# Patient Record
Sex: FEMALE | Race: WHITE | NOT HISPANIC OR LATINO | Employment: FULL TIME | URBAN - METROPOLITAN AREA
[De-identification: names, ages, dates, MRNs, and addresses within clinical notes are randomized per-mention and may not be internally consistent; named-entity substitution may affect disease eponyms.]

---

## 2017-01-16 ENCOUNTER — OFFICE VISIT (OUTPATIENT)
Dept: PEDIATRICS | Facility: CLINIC | Age: 32
End: 2017-01-16
Payer: COMMERCIAL

## 2017-01-16 VITALS
WEIGHT: 136.19 LBS | RESPIRATION RATE: 16 BRPM | BODY MASS INDEX: 21.89 KG/M2 | SYSTOLIC BLOOD PRESSURE: 110 MMHG | TEMPERATURE: 97.8 F | OXYGEN SATURATION: 99 % | DIASTOLIC BLOOD PRESSURE: 70 MMHG | HEIGHT: 66 IN | HEART RATE: 90 BPM

## 2017-01-16 DIAGNOSIS — Z84.0 FAMILY HISTORY OF LUPUS ERYTHEMATOSUS: ICD-10-CM

## 2017-01-16 DIAGNOSIS — M25.512 ACUTE PAIN OF LEFT SHOULDER: ICD-10-CM

## 2017-01-16 DIAGNOSIS — Z00.00 WELL WOMAN EXAM WITHOUT GYNECOLOGICAL EXAM: Primary | ICD-10-CM

## 2017-01-16 LAB
CRP SERPL-MCNC: <2.9 MG/L (ref 0–8)
ERYTHROCYTE [SEDIMENTATION RATE] IN BLOOD BY WESTERGREN METHOD: 9 MM/H (ref 0–20)

## 2017-01-16 PROCEDURE — 86038 ANTINUCLEAR ANTIBODIES: CPT | Performed by: INTERNAL MEDICINE

## 2017-01-16 PROCEDURE — 85652 RBC SED RATE AUTOMATED: CPT | Performed by: INTERNAL MEDICINE

## 2017-01-16 PROCEDURE — 36415 COLL VENOUS BLD VENIPUNCTURE: CPT | Performed by: INTERNAL MEDICINE

## 2017-01-16 PROCEDURE — 86140 C-REACTIVE PROTEIN: CPT | Performed by: INTERNAL MEDICINE

## 2017-01-16 PROCEDURE — 99395 PREV VISIT EST AGE 18-39: CPT | Performed by: INTERNAL MEDICINE

## 2017-01-16 NOTE — NURSING NOTE
"Chief Complaint   Patient presents with     Physical       Initial /70 mmHg  Pulse 90  Temp(Src) 97.8  F (36.6  C) (Oral)  Resp 16  Ht 5' 5.5\" (1.664 m)  Wt 136 lb 3 oz (61.774 kg)  BMI 22.31 kg/m2  SpO2 99%  LMP 01/08/2017 (Exact Date)  Breastfeeding? No Estimated body mass index is 22.31 kg/(m^2) as calculated from the following:    Height as of this encounter: 5' 5.5\" (1.664 m).    Weight as of this encounter: 136 lb 3 oz (61.774 kg).  BP completed using cuff size: sahara Lewis MA 1/16/2017 7:47 AM      "

## 2017-01-16 NOTE — MR AVS SNAPSHOT
After Visit Summary   1/16/2017    Ana Jackson    MRN: [id number]           Patient Information     Date Of Birth          1985        Visit Information        Provider Department      1/16/2017 7:50 AM Chloé Hussein MD [facility]        Today's Diagnoses     Acute pain of left shoulder    -  1     Family history of lupus erythematosus         Well woman exam without gynecological exam           Care Instructions    L shoulder pain:  Most likely impingement syndrome vs rotator cuff injury.  -- start with physical therapy  -- continue with ice, ibuprofen as needed  -- if no improvement in 4 weeks with PT, call for a referral to Sports Medicine    Come back in 6-12 months for pap smear.   Preventive Health Recommendations  Female Ages 26 - 39  Yearly exam:   See your health care provider every year in order to    Review health changes.     Discuss preventive care.      Review your medicines if you your doctor has prescribed any.    Until age 30: Get a Pap test every three years (more often if you have had an abnormal result).    After age 30: Talk to your doctor about whether you should have a Pap test every 3 years or have a Pap test with HPV screening every 5 years.   You do not need a Pap test if your uterus was removed (hysterectomy) and you have not had cancer.  You should be tested each year for STDs (sexually transmitted diseases), if you're at risk.   Talk to your provider about how often to have your cholesterol checked.  If you are at risk for diabetes, you should have a diabetes test (fasting glucose).  Shots: Get a flu shot each year. Get a tetanus shot every 10 years.   Nutrition:     Eat at least 5 servings of fruits and vegetables each day.    Eat whole-grain bread, whole-wheat pasta and brown rice instead of white grains and rice.    Talk to your provider about Calcium and Vitamin D.     Lifestyle    Exercise at least 150 minutes a week (30 minutes a day, 5  days of the week). This will help you control your weight and prevent disease.    Limit alcohol to one drink per day.    No smoking.     Wear sunscreen to prevent skin cancer.    See your dentist every six months for an exam and cleaning.            Follow-ups after your visit        Additional Services     [facility] PT, HAND, AND CHIROPRACTIC REFERRAL       **This order will print in the [facility] Scheduling Office**    Physical Therapy, Hand Therapy and Chiropractic Care are available through:    *[facility]  *[facility]  *[facility]    Call one number to schedule at any of the above locations: [phone].    Your provider has referred you to: Physical Therapy at [facility] or [facility]    Indication/Reason for Referral: Shoulder Pain  Onset of Illness: 3 months ago  Therapy Orders: Evaluate and Treat  Special Programs: None  Special Request: None    Chiquita Porras      Additional Comments for the Therapist or Chiropractor:     Please be aware that coverage of these services is subject to the terms and limitations of your health insurance plan.  Call member services at your health plan with any benefit or coverage questions.      Please bring the following to your appointment:    *Your personal calendar for scheduling future appointments  *Comfortable clothing                  Who to contact     If you have questions or need follow up information about today's clinic visit or your schedule please contact [facility] directly at [phone].  Normal or non-critical lab and imaging results will be communicated to you by MyChart, letter or phone within 4 business days after the clinic has received the results. If you do not hear from us within 7 days, please contact the clinic through MyChart or phone. If you have a critical or abnormal lab result, we will notify you by phone as soon as possible.  Submit refill requests through [employer] or call your pharmacy and they  "will forward the refill request to us. Please allow 3 business days for your refill to be completed.          Additional Information About Your Visit        [employer] Information     [employer] lets you send messages to your doctor, view your test results, renew your prescriptions, schedule appointments and more. To sign up, go to [email or web address] . Click on \"Log in\" on the left side of the screen, which will take you to the Welcome page. Then click on \"Sign up Now\" on the right side of the page.     You will be asked to enter the access code listed below, as well as some personal information. Please follow the directions to create your username and password.     Your access code is: [email or web address]  Expires: 2017  8:21 AM     Your access code will  in 90 days. If you need help or a new code, please call your [facility] clinic or [phone].        Care EveryWhere ID     This is your [facility] EveryWhere ID. This could be used by other organizations to access your [facility] medical records  [id number]        Your Vitals Were     Pulse Temperature Respirations    90 97.8  F (36.6  C) (Oral) 16    Height BMI (Body Mass Index) Pulse Oximetry    5' 5.5\" (1.664 m) 22.31 kg/m2 99%    Last Period Breastfeeding?       2017 (Exact Date) No        Blood Pressure from Last 3 Encounters:   17 110/70   10/14/16 127/77   16 104/62    Weight from Last 3 Encounters:   17 136 lb 3 oz (61.774 kg)   10/14/16 132 lb 3.2 oz (59.966 kg)   16 128 lb 14.4 oz (58.469 kg)              We Performed the Following     Antinuclear antibody screen by EIA     CRP, inflammation     Erythrocyte sedimentation rate auto     [facility] PT, HAND, AND CHIROPRACTIC REFERRAL        Primary Care Provider Office Phone # Fax #    Chloé Hussein [phone]       [facility] [address] [place] [address]        Thank you!     Thank you for choosing [facility]  for your care. Our goal is " always to provide you with excellent care. Hearing back from our patients is one way we can continue to improve our services. Please take a few minutes to complete the written survey that you may receive in the mail after your visit with us. Thank you!             Your Updated Medication List - Protect others around you: Learn how to safely use, store and throw away your medicines at www.disposemymeds.org.          This list is accurate as of: 1/16/17  8:21 AM.  Always use your most recent med list.                   Brand Name Dispense Instructions for use    desogestrel-ethinyl estradiol 0.15-30 MG-MCG per tablet    APRI    84 tablet    Take 1 tablet by mouth daily

## 2017-01-16 NOTE — PROGRESS NOTES
SUBJECTIVE:     CC: Ana Jackson is an 31 year old woman who presents for preventive health visit.     Healthy Habits:    Do you get at least three servings of calcium containing foods daily (dairy, green leafy vegetables, etc.)? Yes- unsure , taking calcium and/or vitamin D supplement: no    Amount of exercise or daily activities, outside of work: 3-4 day(s) per week    Problems taking medications regularly No    Medication side effects: No    Have you had an eye exam in the past two years? no    Do you see a dentist twice per year? no    Do you have sleep apnea, excessive snoring or daytime drowsiness?no    Concerns:  1. Skin concern: Has a flesh colored mole on L shoulder, has been stable but wants to know if this looks ok.    2. L shoulder pain: This has been going on since prior to being seen in October. Has improved slightly, but  at front of shoulder. Struggles to lift arm high behind her back. No injury that she recalls. No swelling or redness. Does have family history of SLE in her mother, wondering if this could be contributing.     Today's PHQ-2 Score:   PHQ-2 ( 1999 Pfizer) 1/16/2017 6/17/2016   Q1: Little interest or pleasure in doing things 0 0   Q2: Feeling down, depressed or hopeless 0 0   PHQ-2 Score 0 0       Abuse: Current or Past(Physical, Sexual or Emotional)- No  Do you feel safe in your environment - Yes    Social History   Substance Use Topics     Smoking status: Former Smoker -- 0.50 packs/day for 2 years     Types: Cigarettes     Quit date: 11/01/2005     Smokeless tobacco: Not on file      Comment: NO 2nd hand smoke at work     Alcohol Use: Yes      Comment: occ.     The patient does not drink >3 drinks per day nor >7 drinks per week.    Recent Labs   Lab Test  10/26/15   1020   CHOL  203*   HDL  68   LDL  119   TRIG  79   CHOLHDLRATIO  3.0       Reviewed orders with patient.  Reviewed health maintenance and updated orders accordingly - Yes    Mammo Decision  "Support:  Mammo discussed, not appropriate for or declined by this patient.    Pertinent mammograms are reviewed under the imaging tab.  History of abnormal Pap smear: YES - updated in Problem List and Health Maintenance accordingly  All Histories reviewed and updated in Epic.      ROS:  C: NEGATIVE for fever, chills, change in weight  I: NEGATIVE for worrisome rashes, moles or lesions  E: NEGATIVE for vision changes or irritation  ENT: NEGATIVE for ear, mouth and throat problems  R: NEGATIVE for significant cough or SOB  B: NEGATIVE for masses, tenderness or discharge  CV: NEGATIVE for chest pain, palpitations or peripheral edema  GI: NEGATIVE for nausea, abdominal pain, heartburn, or change in bowel habits  : NEGATIVE for unusual urinary or vaginal symptoms. Periods are regular.  MUSCULOSKELETAL:See above.   N: NEGATIVE for weakness, dizziness or paresthesias  P: NEGATIVE for changes in mood or affect    Problem list, Medication list, Allergies, and Medical/Social/Surgical histories reviewed in EPIC and updated as appropriate.  Labs reviewed in EPIC  OBJECTIVE:     /70 mmHg  Pulse 90  Temp(Src) 97.8  F (36.6  C) (Oral)  Resp 16  Ht 5' 5.5\" (1.664 m)  Wt 136 lb 3 oz (61.774 kg)  BMI 22.31 kg/m2  SpO2 99%  LMP 01/08/2017 (Exact Date)  Breastfeeding? No  EXAM:  GENERAL: healthy, alert and no distress  EYES: Eyes grossly normal to inspection, PERRL and conjunctivae and sclerae normal  HENT: ear canals and TM's normal, nose and mouth without ulcers or lesions  NECK: no adenopathy, no asymmetry, masses, or scars and thyroid normal to palpation  RESP: lungs clear to auscultation - no rales, rhonchi or wheezes  BREAST: normal without masses, tenderness or nipple discharge and no palpable axillary masses or adenopathy  CV: regular rate and rhythm, normal S1 S2, no S3 or S4, no murmur, click or rub, no peripheral edema and peripheral pulses strong  ABDOMEN: soft, nontender, no hepatosplenomegaly, no masses " "and bowel sounds normal  MS: mild tenderness to palpation over acromion and L anterior shoulder. Pain limits internal rotation and extension.   SKIN: Small 3mm flesh colored papule over L shoulder.   NEURO: Normal strength and tone, mentation intact and speech normal  PSYCH: mentation appears normal, affect normal/bright    ASSESSMENT/PLAN:     1. Well woman exam without gynecological exam  Counseling as below. Due for pap in 6 months given hx of abnormal paps. Previously received flu vaccine. Due for colonoscopy at age 37.    2. Acute pain of left shoulder  Anticipate this is likely impingement syndrome. Less likely rotator cuff tear, given lack of injury but certainly possible. Will trial PT, NSAIDs, and ice x4 weeks, if no improvement patient will call for [facility] referral.   - [facility] PT, HAND, AND CHIROPRACTIC REFERRAL  - CRP, inflammation  - Erythrocyte sedimentation rate auto  - Antinuclear antibody screen by EIA    3. Family history of lupus erythematosus  Anticipate that this is likely not the source of patient's L shoulder pain, however, given family history and patient's concerns reasonable to screen.   - [facility] PT, HAND, AND CHIROPRACTIC REFERRAL    COUNSELING:   Reviewed preventive health counseling, as reflected in patient instructions  Special attention given to:        Regular exercise       Healthy diet/nutrition       Contraception       Colon cancer screening         reports that she quit smoking about 11 years ago. Her smoking use included Cigarettes. She has a 1 pack-year smoking history. She does not have any smokeless tobacco history on file.    Estimated body mass index is 21.66 kg/(m^2) as calculated from the following:    Height as of 10/14/16: 5' 5.5\" (1.664 m).    Weight as of 10/14/16: 132 lb 3.2 oz (59.966 kg).       Counseling Resources:  ATP IV Guidelines  Pooled Cohorts Equation Calculator  Breast Cancer Risk Calculator  FRAX Risk Assessment  ICSI Preventive Guidelines  Dietary Guidelines for " Americans, 2010  USDA's MyPlate  ASA Prophylaxis  Lung CA Screening    Chloé Jackson MD  [facility]

## 2017-01-16 NOTE — PATIENT INSTRUCTIONS
L shoulder pain:  Most likely impingement syndrome vs rotator cuff injury.  -- start with physical therapy  -- continue with ice, ibuprofen as needed  -- if no improvement in 4 weeks with PT, call for a referral to Sports Medicine    Come back in 6-12 months for pap smear.   Preventive Health Recommendations  Female Ages 26 - 39  Yearly exam:   See your health care provider every year in order to    Review health changes.     Discuss preventive care.      Review your medicines if you your doctor has prescribed any.    Until age 30: Get a Pap test every three years (more often if you have had an abnormal result).    After age 30: Talk to your doctor about whether you should have a Pap test every 3 years or have a Pap test with HPV screening every 5 years.   You do not need a Pap test if your uterus was removed (hysterectomy) and you have not had cancer.  You should be tested each year for STDs (sexually transmitted diseases), if you're at risk.   Talk to your provider about how often to have your cholesterol checked.  If you are at risk for diabetes, you should have a diabetes test (fasting glucose).  Shots: Get a flu shot each year. Get a tetanus shot every 10 years.   Nutrition:     Eat at least 5 servings of fruits and vegetables each day.    Eat whole-grain bread, whole-wheat pasta and brown rice instead of white grains and rice.    Talk to your provider about Calcium and Vitamin D.     Lifestyle    Exercise at least 150 minutes a week (30 minutes a day, 5 days of the week). This will help you control your weight and prevent disease.    Limit alcohol to one drink per day.    No smoking.     Wear sunscreen to prevent skin cancer.    See your dentist every six months for an exam and cleaning.

## 2017-01-17 LAB — ANA SER QL IA: NORMAL

## 2017-03-09 DIAGNOSIS — Z30.41 ORAL CONTRACEPTIVE PILL SURVEILLANCE: ICD-10-CM

## 2017-03-09 RX ORDER — DESOGESTREL AND ETHINYL ESTRADIOL 0.15-0.03
1 KIT ORAL DAILY
Qty: 84 TABLET | Refills: 1 | Status: SHIPPED | OUTPATIENT
Start: 2017-03-09 | End: 2017-08-19

## 2017-05-26 ENCOUNTER — HOSPITAL ENCOUNTER (EMERGENCY)
Facility: CLINIC | Age: 32
Discharge: HOME | End: 2017-05-26
Attending: EMERGENCY MEDICINE | Admitting: EMERGENCY MEDICINE
Payer: COMMERCIAL

## 2017-05-26 VITALS
RESPIRATION RATE: 20 BRPM | DIASTOLIC BLOOD PRESSURE: 62 MMHG | HEART RATE: 100 BPM | SYSTOLIC BLOOD PRESSURE: 106 MMHG | TEMPERATURE: 97.6 F | OXYGEN SATURATION: 98 %

## 2017-05-26 DIAGNOSIS — K52.9 GASTROENTERITIS: ICD-10-CM

## 2017-05-26 LAB
ALBUMIN UR-MCNC: NEGATIVE MG/DL
ANION GAP SERPL CALCULATED.3IONS-SCNC: 8 MMOL/L (ref 3–14)
APPEARANCE UR: ABNORMAL
BACTERIA #/AREA URNS HPF: ABNORMAL /HPF
BASOPHILS # BLD AUTO: 0.1 10E9/L (ref 0–0.2)
BASOPHILS NFR BLD AUTO: 1.1 %
BILIRUB UR QL STRIP: NEGATIVE
BUN SERPL-MCNC: 13 MG/DL (ref 7–30)
CALCIUM SERPL-MCNC: 8.6 MG/DL (ref 8.5–10.1)
CHLORIDE SERPL-SCNC: 107 MMOL/L (ref 94–109)
CO2 SERPL-SCNC: 23 MMOL/L (ref 20–32)
COLOR UR AUTO: YELLOW
CREAT SERPL-MCNC: 0.66 MG/DL (ref 0.52–1.04)
DIFFERENTIAL METHOD BLD: NORMAL
EOSINOPHIL # BLD AUTO: 0.4 10E9/L (ref 0–0.7)
EOSINOPHIL NFR BLD AUTO: 6.3 %
ERYTHROCYTE [DISTWIDTH] IN BLOOD BY AUTOMATED COUNT: 11.5 % (ref 10–15)
GFR SERPL CREATININE-BSD FRML MDRD: ABNORMAL ML/MIN/1.7M2
GLUCOSE SERPL-MCNC: 102 MG/DL (ref 70–99)
GLUCOSE UR STRIP-MCNC: NEGATIVE MG/DL
HCG UR QL: NEGATIVE
HCT VFR BLD AUTO: 35.6 % (ref 35–47)
HGB BLD-MCNC: 12 G/DL (ref 11.7–15.7)
HGB UR QL STRIP: NEGATIVE
IMM GRANULOCYTES # BLD: 0 10E9/L (ref 0–0.4)
IMM GRANULOCYTES NFR BLD: 0.2 %
KETONES UR STRIP-MCNC: 20 MG/DL
LEUKOCYTE ESTERASE UR QL STRIP: NEGATIVE
LYMPHOCYTES # BLD AUTO: 1.4 10E9/L (ref 0.8–5.3)
LYMPHOCYTES NFR BLD AUTO: 21.3 %
MCH RBC QN AUTO: 29.6 PG (ref 26.5–33)
MCHC RBC AUTO-ENTMCNC: 33.7 G/DL (ref 31.5–36.5)
MCV RBC AUTO: 88 FL (ref 78–100)
MONOCYTES # BLD AUTO: 0.7 10E9/L (ref 0–1.3)
MONOCYTES NFR BLD AUTO: 10.8 %
MUCOUS THREADS #/AREA URNS LPF: PRESENT /LPF
NEUTROPHILS # BLD AUTO: 3.9 10E9/L (ref 1.6–8.3)
NEUTROPHILS NFR BLD AUTO: 60.3 %
NITRATE UR QL: NEGATIVE
NRBC # BLD AUTO: 0 10*3/UL
NRBC BLD AUTO-RTO: 0 /100
PH UR STRIP: 6 PH (ref 5–7)
PLATELET # BLD AUTO: 261 10E9/L (ref 150–450)
POTASSIUM SERPL-SCNC: 3.3 MMOL/L (ref 3.4–5.3)
RBC # BLD AUTO: 4.05 10E12/L (ref 3.8–5.2)
RBC #/AREA URNS AUTO: 3 /HPF (ref 0–2)
SODIUM SERPL-SCNC: 138 MMOL/L (ref 133–144)
SP GR UR STRIP: 1.03 (ref 1–1.03)
SQUAMOUS #/AREA URNS AUTO: 8 /HPF (ref 0–1)
URN SPEC COLLECT METH UR: ABNORMAL
UROBILINOGEN UR STRIP-MCNC: 0 MG/DL (ref 0–2)
WBC # BLD AUTO: 6.5 10E9/L (ref 4–11)
WBC #/AREA URNS AUTO: 1 /HPF (ref 0–2)

## 2017-05-26 PROCEDURE — 81001 URINALYSIS AUTO W/SCOPE: CPT | Performed by: EMERGENCY MEDICINE

## 2017-05-26 PROCEDURE — 96361 HYDRATE IV INFUSION ADD-ON: CPT

## 2017-05-26 PROCEDURE — 25000128 H RX IP 250 OP 636: Performed by: EMERGENCY MEDICINE

## 2017-05-26 PROCEDURE — 80048 BASIC METABOLIC PNL TOTAL CA: CPT | Performed by: EMERGENCY MEDICINE

## 2017-05-26 PROCEDURE — 25000132 ZZH RX MED GY IP 250 OP 250 PS 637: Performed by: EMERGENCY MEDICINE

## 2017-05-26 PROCEDURE — 99284 EMERGENCY DEPT VISIT MOD MDM: CPT | Mod: 25

## 2017-05-26 PROCEDURE — 96374 THER/PROPH/DIAG INJ IV PUSH: CPT

## 2017-05-26 PROCEDURE — 81025 URINE PREGNANCY TEST: CPT | Performed by: EMERGENCY MEDICINE

## 2017-05-26 PROCEDURE — 85025 COMPLETE CBC W/AUTO DIFF WBC: CPT | Performed by: EMERGENCY MEDICINE

## 2017-05-26 RX ORDER — ONDANSETRON 2 MG/ML
INJECTION INTRAMUSCULAR; INTRAVENOUS
Status: DISCONTINUED
Start: 2017-05-26 | End: 2017-05-26 | Stop reason: HOSPADM

## 2017-05-26 RX ORDER — ONDANSETRON 2 MG/ML
4 INJECTION INTRAMUSCULAR; INTRAVENOUS EVERY 30 MIN PRN
Status: DISCONTINUED | OUTPATIENT
Start: 2017-05-26 | End: 2017-05-26 | Stop reason: HOSPADM

## 2017-05-26 RX ORDER — DIPHENOXYLATE HCL/ATROPINE 2.5-.025MG
2 TABLET ORAL ONCE
Status: COMPLETED | OUTPATIENT
Start: 2017-05-26 | End: 2017-05-26

## 2017-05-26 RX ORDER — ONDANSETRON 4 MG/1
4 TABLET, FILM COATED ORAL EVERY 8 HOURS PRN
Qty: 10 TABLET | Refills: 0 | Status: SHIPPED | OUTPATIENT
Start: 2017-05-26 | End: 2018-06-12

## 2017-05-26 RX ORDER — SODIUM CHLORIDE 9 MG/ML
1000 INJECTION, SOLUTION INTRAVENOUS CONTINUOUS
Status: DISCONTINUED | OUTPATIENT
Start: 2017-05-26 | End: 2017-05-26 | Stop reason: HOSPADM

## 2017-05-26 RX ORDER — LIDOCAINE 40 MG/G
CREAM TOPICAL
Status: DISCONTINUED | OUTPATIENT
Start: 2017-05-26 | End: 2017-05-26 | Stop reason: HOSPADM

## 2017-05-26 RX ORDER — LOPERAMIDE HYDROCHLORIDE 2 MG/1
2 TABLET ORAL 4 TIMES DAILY PRN
Qty: 30 TABLET | Refills: 0 | Status: SHIPPED | OUTPATIENT
Start: 2017-05-26 | End: 2018-06-12

## 2017-05-26 RX ADMIN — DIPHENOXYLATE HYDROCHLORIDE AND ATROPINE SULFATE 2 TABLET: 2.5; .025 TABLET ORAL at 03:07

## 2017-05-26 RX ADMIN — ONDANSETRON 4 MG: 2 INJECTION INTRAMUSCULAR; INTRAVENOUS at 02:41

## 2017-05-26 RX ADMIN — SODIUM CHLORIDE 1000 ML: 9 INJECTION, SOLUTION INTRAVENOUS at 02:41

## 2017-05-26 ASSESSMENT — ENCOUNTER SYMPTOMS
SHORTNESS OF BREATH: 0
NAUSEA: 1
FEVER: 0
DIARRHEA: 1
VOMITING: 1
ABDOMINAL PAIN: 1

## 2017-05-26 NOTE — ED NOTES
Pt c/o pain in chest and back for 3 days moving to epigastric area with NVD    Pt A&O x 3, CMS x 3, ABCD's adequate in triage

## 2017-05-26 NOTE — ED PROVIDER NOTES
History     Chief Complaint:  Multiple Complaints     The history is provided by the patient.      Ana Jackson is a 31 year old female who presents with multiple complaints.  Patient reports feeling unwell the last few days with pain in her chest and back.  This evening pain seemed more localized to her epigastrium and was associated by nausea, vomiting, and diarrhea.  She notes two total episodes of emesis and having episode of diarrhea every 15-20 minutes prompting visit to the emergency department.  Patient does note only recent sick contact was foster son who was diagnosed with a carbapenem-resistant enterobacteriaceae.  She denies recent foreign travel, fevers, shortness of breath, or other complaint.     Allergies:  No known drug allergies      Medications:    Apri     Past Medical History:    Closed fracture of forearm  Herpangina  Migraine  Excessive or frequent menstruation    Past Surgical History:    Tooth extraction    Family History:    Respiratory  Colon cancer  Asthma    Social History:  Presents with spouse   Tobacco use: Former 0.50 PPD smoker of 2 years, QD 2005  Alcohol use: Occasional  PCP: Chloé Jackson    Marital Status:         Review of Systems   Constitutional: Negative for fever.   Respiratory: Negative for shortness of breath.    Cardiovascular: Positive for chest pain.   Gastrointestinal: Positive for abdominal pain, diarrhea, nausea and vomiting.   All other systems reviewed and are negative.      Physical Exam     Patient Vitals for the past 24 hrs:   BP Temp Temp src Pulse Resp SpO2   05/26/17 0445 - - - - - 98 %   05/26/17 0430 106/67 - - - - 97 %   05/26/17 0415 - - - - - 100 %   05/26/17 0400 110/72 - - - - 97 %   05/26/17 0330 102/57 - - - - 99 %   05/26/17 0309 99/73 - - - - -   05/26/17 0213 125/86 97.6  F (36.4  C) Temporal 100 20 99 %        Physical Exam  General: Patient is uncomfortable, lying on back with hips in flexion.  HENT: Moist oral mucosa.  Eyes:  EOMI. Normal conjunctiva.  Neck: Normal range of motion. Neck supple.  Cardiovascular:  Rate 100 bpm, regular rhythm.  Pulmonary/Chest: Effort normal.    Abdominal: Thin, soft. Patient exhibits no distension and no mass. There is no tenderness.      There is no guarding.   Musculoskeletal: Normal range of motion.   Neurological: Patient  is alert and oriented.   Skin: Skin is warm and dry. No rash noted.   Psychiatric: Patient has a normal mood and affect. Normal behavior and judgement.      Emergency Department Course   Laboratory:  CBC: WNL (WBC 6.5, HGB 12.0, )   BMP: Potassium 3.3 (L), Glucose 102 (H) ow WNL (Creatinine 0.66)     UA: Ketone 20, RBC 3 (H), Bacteria few, Squam epithelial 8 (H), Mucous present, o/w Negative   UPT: Negative    Interventions:  0241: NS 1L IV Bolus   0241: Zofran 4 mg IV   0307: Lomotil 2 tablets PO     Emergency Department Course:  Past medical records, nursing notes, and vitals reviewed.  0225: I performed an exam of the patient and obtained history as documented above.    Above workup undertaken.  I personally reviewed the laboratory results with the Patient and spouse and answered all related questions prior to discharge.    0502: I rechecked the patient. Feels much improved. Findings and plan explained to the Patient and spouse. Patient discharged home with instructions regarding supportive care, medications, and reasons to return. The importance of close follow-up was reviewed.      Impression & Plan    Medical Decision Making:  Ana Jackson is a 31 year old female who presents for evaluation of nausea, vomiting and diarrhea with mild abdominal pain in a nonfocal abdominal exam.  They do note foster son was recently diagnosed with CRE infection though have no other known sick contacts.  I considered a broad differential diagnosis for this patient including viral gastroenteritis, bacterial infection of the large intestine (salmonella, shigella, campylobacter, e  coli, etc), bowel obstruction, intra-abdominal infection such as colitis, food poisoning, cholecystitis, UTI, pyelonephritis, appendicitis, etc.  There are no signs of worrisome intra-abdominal pathologies detected during the visit today.  She has a completely benign abdominal exam without rebound, guarding, or marked tenderness to palpation.  Supportive outpatient management is therefore indicated.  Abdominal pain precautions are given for home.   No indication for stool studies at this time.  No indication for CT at this time.  She passed oral challenge here in ED. It was discussed to return to the ED for blood in stool, increasing pain, or fevers more than 102.   Feels much improved after interventions in ED.      Diagnosis:    ICD-10-CM    1. Gastroenteritis K52.9        Disposition:  Discharged to home with plan as outlined.    Discharge Medications:  New Prescriptions    LOPERAMIDE (IMODIUM A-D) 2 MG TABLET    Take 1 tablet (2 mg) by mouth 4 times daily as needed for diarrhea    ONDANSETRON (ZOFRAN) 4 MG TABLET    Take 1 tablet (4 mg) by mouth every 8 hours as needed for nausea         Harvey Gordon  5/26/2017   [facility] EMERGENCY DEPARTMENT  IHarvey, wilman serving as a scribe at 2:25 AM on 5/26/2017 to document services personally performed by Mahad Mendez MD based on my observations and the provider's statements to me.       Mahad Mendez MD  05/26/17 [id number]

## 2017-05-26 NOTE — ED AVS SNAPSHOT
[facility] Emergency Department    [address] [place] [address]    Phone:  [phone]    Fax:  [phone]                                       Ana Jackson   MRN: [id number]    Department:  [facility] Emergency Department   Date of Visit:  5/26/2017           Patient Information     Date Of Birth          1985        Your diagnoses for this visit were:     Gastroenteritis        You were seen by Mahad Mendez MD.      Follow-up Information     Follow up with hCloé Hussein MD.    Specialty:  Internal Medicine    Why:  As needed, for re-evaluation of your symptoms if not gradually improving    Contact information:    [facility]  [address] [place] [address] [phone]          Discharge Instructions       Discharge Instructions  Gastroenteritis    You have been seen today for vomiting and diarrhea, called gastroenteritis or the stomach flu. This is usually caused by a virus, but some bacteria, parasites, medicines or other medical conditions can cause similar symptoms. At this time your doctor does not find that your vomiting and diarrhea is a sign of anything dangerous or life-threatening.  However, sometimes the signs of serious illness do not show up right away.  If you have new or worse symptoms, you may need to be seen again in the Emergency Department or by your primary doctor. Remember that serious problems like appendicitis can look like gastroenteritis at first.       Return to the Emergency Department if:    You keep throwing up and you are not able to keep liquids down.    You feel you are getting dehydrated, such as being very thirsty, not urinating at least every 8-12 hours, or feeling faint or lightheaded.     You develop a new fever, or your fever continues for more than 2 days.    You have belly pain that seems worse than cramps, is in one spot, or is getting worse over time.     You have blood in  your vomit or in your diarrhea.    You feel very weak.    You are not starting to improve within 24 hours of your visit here.    What can I do to help myself?    The most important thing to do is to drink clear liquids.  If you have been vomiting a lot, it is best to have only small, frequent sips of liquids.  Drinking too much at once may cause more vomiting. If you are vomiting often, you must replace minerals, sodium and potassium lost with your illness. Pedialyte  and sports drinks can help you replace these minerals.  You can also drink clear liquids such as water, weak tea, apple juice, and 7-Up . Avoid acid liquids (orange), caffeine (coffee) or alcohol. Do not drink milk until you no longer have diarrhea.    After liquids are staying down, you may start eating mild foods. Soda crackers, toast, plain noodles, gelatin, applesauce and bananas are good first choices.  Avoid foods that have acid, are spicy, fatty or fibrous (such as meats, coarse grains, vegetables). You may start eating these foods again in about 3 days when you are better.    Sometimes treatment includes prescription medicine to prevent nausea and vomiting and to prevent diarrhea. If your doctor prescribes these for you, take them as directed.    Nonprescription medicine is available for the treatment of diarrhea and can be very effective.  If you use it, make sure you use the dose recommended on the package. Avoid Lomotil . Check with your healthcare provider before you use any medicine for diarrhea.    Don t take ibuprofen, or other nonsteroidal anti-inflammatory medicines without checking with your healthcare provider.  If you were given a prescription for medicine here today, be sure to read all of the information (including the package insert) that comes with your prescription.  This will include important information about the medicine, its side effects, and any warnings that you need to know about.  The pharmacist who fills the  prescription can provide more information and answer questions you may have about the medicine.  If you have questions or concerns that the pharmacist cannot address, please call or return to the Emergency Department.       24 Hour Appointment Hotline       To make an appointment at any [facility]3], call [phone] ([phone]). If you don't have a family doctor or clinic, we will help you find one. [facility] clinics are conveniently located to serve the needs of you and your family.             Review of your medicines      START taking        Dose / Directions Last dose taken    loperamide 2 MG tablet   Commonly known as:  IMODIUM A-D   Dose:  2 mg   Quantity:  30 tablet        Take 1 tablet (2 mg) by mouth 4 times daily as needed for diarrhea   Refills:  0        ondansetron 4 MG tablet   Commonly known as:  ZOFRAN   Dose:  4 mg   Quantity:  10 tablet        Take 1 tablet (4 mg) by mouth every 8 hours as needed for nausea   Refills:  0          Our records show that you are taking the medicines listed below. If these are incorrect, please call your family doctor or clinic.        Dose / Directions Last dose taken    desogestrel-ethinyl estradiol 0.15-30 MG-MCG per tablet   Commonly known as:  APRI   Dose:  1 tablet   Quantity:  84 tablet        Take 1 tablet by mouth daily   Refills:  1                Prescriptions were sent or printed at these locations (2 Prescriptions)                   Other Prescriptions                Printed at Department/Unit printer (2 of 2)         ondansetron (ZOFRAN) 4 MG tablet               loperamide (IMODIUM A-D) 2 MG tablet                Procedures and tests performed during your visit     Basic metabolic panel    CBC with platelets differential    HCG qualitative urine    UA with Microscopic      Orders Needing Specimen Collection     None      Pending Results     No orders found from 5/24/2017 to 5/27/2017.            Pending Culture Results     No orders found from  5/24/2017 to 5/27/2017.            Pending Results Instructions     If you had any lab results that were not finalized at the time of your Discharge, you can call the ED Lab Result RN at [phone]. You will be contacted by this team for any positive Lab results or changes in treatment. The nurses are available 7 days a week from 10A to 6:30P.  You can leave a message 24 hours per day and they will return your call.        Test Results From Your Hospital Stay        5/26/2017  2:56 AM      Component Results     Component Value Ref Range & Units Status    WBC 6.5 4.0 - 11.0 10e9/L Final    RBC Count 4.05 3.8 - 5.2 10e12/L Final    Hemoglobin 12.0 11.7 - 15.7 g/dL Final    Hematocrit 35.6 35.0 - 47.0 % Final    MCV 88 78 - 100 fl Final    MCH 29.6 26.5 - 33.0 pg Final    MCHC 33.7 31.5 - 36.5 g/dL Final    RDW 11.5 10.0 - 15.0 % Final    Platelet Count 261 150 - 450 10e9/L Final    Diff Method Automated Method  Final    % Neutrophils 60.3 % Final    % Lymphocytes 21.3 % Final    % Monocytes 10.8 % Final    % Eosinophils 6.3 % Final    % Basophils 1.1 % Final    % Immature Granulocytes 0.2 % Final    Nucleated RBCs 0 0 /100 Final    Absolute Neutrophil 3.9 1.6 - 8.3 10e9/L Final    Absolute Lymphocytes 1.4 0.8 - 5.3 10e9/L Final    Absolute Monocytes 0.7 0.0 - 1.3 10e9/L Final    Absolute Eosinophils 0.4 0.0 - 0.7 10e9/L Final    Absolute Basophils 0.1 0.0 - 0.2 10e9/L Final    Abs Immature Granulocytes 0.0 0 - 0.4 10e9/L Final    Absolute Nucleated RBC 0.0  Final         5/26/2017  3:05 AM      Component Results     Component Value Ref Range & Units Status    Sodium 138 133 - 144 mmol/L Final    Potassium 3.3 (L) 3.4 - 5.3 mmol/L Final    Chloride 107 94 - 109 mmol/L Final    Carbon Dioxide 23 20 - 32 mmol/L Final    Anion Gap 8 3 - 14 mmol/L Final    Glucose 102 (H) 70 - 99 mg/dL Final    Urea Nitrogen 13 7 - 30 mg/dL Final    Creatinine 0.66 0.52 - 1.04 mg/dL Final    GFR Estimate >90  Non  GFR  Calc   >60 mL/min/1.7m2 Final    GFR Estimate If Black >90   GFR Calc   >60 mL/min/1.7m2 Final    Calcium 8.6 8.5 - 10.1 mg/dL Final         5/26/2017  3:40 AM      Component Results     Component Value Ref Range & Units Status    Color Urine Yellow  Final    Appearance Urine Slightly Cloudy  Final    Glucose Urine Negative NEG mg/dL Final    Bilirubin Urine Negative NEG Final    Ketones Urine 20 (A) NEG mg/dL Final    Specific Gravity Urine 1.027 1.003 - 1.035 Final    Blood Urine Negative NEG Final    pH Urine 6.0 5.0 - 7.0 pH Final    Protein Albumin Urine Negative NEG mg/dL Final    Urobilinogen mg/dL 0.0 0.0 - 2.0 mg/dL Final    Nitrite Urine Negative NEG Final    Leukocyte Esterase Urine Negative NEG Final    Source Midstream Urine  Final    WBC Urine 1 0 - 2 /HPF Final    RBC Urine 3 (H) 0 - 2 /HPF Final    Bacteria Urine Few (A) NEG /HPF Final    Squamous Epithelial /HPF Urine 8 (H) 0 - 1 /HPF Final    Mucous Urine Present (A) NEG /LPF Final         5/26/2017  3:41 AM      Component Results     Component Value Ref Range & Units Status    HCG Qual Urine Negative NEG Final                Clinical Quality Measure: Blood Pressure Screening     Your blood pressure was checked while you were in the emergency department today. The last reading we obtained was  BP: 106/67 . Please read the guidelines below about what these numbers mean and what you should do about them.  If your systolic blood pressure (the top number) is less than 120 and your diastolic blood pressure (the bottom number) is less than 80, then your blood pressure is normal. There is nothing more that you need to do about it.  If your systolic blood pressure (the top number) is 120-139 or your diastolic blood pressure (the bottom number) is 80-89, your blood pressure may be higher than it should be. You should have your blood pressure rechecked within a year by a primary care provider.  If your systolic blood pressure (the top number)  is 140 or greater or your diastolic blood pressure (the bottom number) is 90 or greater, you may have high blood pressure. High blood pressure is treatable, but if left untreated over time it can put you at risk for heart attack, stroke, or kidney failure. You should have your blood pressure rechecked by a primary care provider within the next 4 weeks.  If your provider in the emergency department today gave you specific instructions to follow-up with your doctor or provider even sooner than that, you should follow that instruction and not wait for up to 4 weeks for your follow-up visit.        Thank you for choosing [facility]       Thank you for choosing [facility] for your care. Our goal is always to provide you with excellent care. Hearing back from our patients is one way we can continue to improve our services. Please take a few minutes to complete the written survey that you may receive in the mail after you visit with us. Thank you!        [facility] Information     [facility] gives you secure access to your electronic health record. If you see a primary care provider, you can also send messages to your care team and make appointments. If you have questions, please call your primary care clinic.  If you do not have a primary care provider, please call [phone] and they will assist you.        Care EveryWhere ID     This is your Care EveryWhere ID. This could be used by other organizations to access your [facility] medical records  [id number]        After Visit Summary       This is your record. Keep this with you and show to your community pharmacist(s) and doctor(s) at your next visit.

## 2017-05-26 NOTE — DISCHARGE INSTRUCTIONS
Discharge Instructions  Gastroenteritis    You have been seen today for vomiting and diarrhea, called gastroenteritis or the stomach flu. This is usually caused by a virus, but some bacteria, parasites, medicines or other medical conditions can cause similar symptoms. At this time your doctor does not find that your vomiting and diarrhea is a sign of anything dangerous or life-threatening.  However, sometimes the signs of serious illness do not show up right away.  If you have new or worse symptoms, you may need to be seen again in the Emergency Department or by your primary doctor. Remember that serious problems like appendicitis can look like gastroenteritis at first.       Return to the Emergency Department if:    You keep throwing up and you are not able to keep liquids down.    You feel you are getting dehydrated, such as being very thirsty, not urinating at least every 8-12 hours, or feeling faint or lightheaded.     You develop a new fever, or your fever continues for more than 2 days.    You have belly pain that seems worse than cramps, is in one spot, or is getting worse over time.     You have blood in your vomit or in your diarrhea.    You feel very weak.    You are not starting to improve within 24 hours of your visit here.    What can I do to help myself?    The most important thing to do is to drink clear liquids.  If you have been vomiting a lot, it is best to have only small, frequent sips of liquids.  Drinking too much at once may cause more vomiting. If you are vomiting often, you must replace minerals, sodium and potassium lost with your illness. Pedialyte  and sports drinks can help you replace these minerals.  You can also drink clear liquids such as water, weak tea, apple juice, and 7-Up . Avoid acid liquids (orange), caffeine (coffee) or alcohol. Do not drink milk until you no longer have diarrhea.    After liquids are staying down, you may start eating mild foods. Soda crackers, toast, plain  noodles, gelatin, applesauce and bananas are good first choices.  Avoid foods that have acid, are spicy, fatty or fibrous (such as meats, coarse grains, vegetables). You may start eating these foods again in about 3 days when you are better.    Sometimes treatment includes prescription medicine to prevent nausea and vomiting and to prevent diarrhea. If your doctor prescribes these for you, take them as directed.    Nonprescription medicine is available for the treatment of diarrhea and can be very effective.  If you use it, make sure you use the dose recommended on the package. Avoid Lomotil . Check with your healthcare provider before you use any medicine for diarrhea.    Don t take ibuprofen, or other nonsteroidal anti-inflammatory medicines without checking with your healthcare provider.  If you were given a prescription for medicine here today, be sure to read all of the information (including the package insert) that comes with your prescription.  This will include important information about the medicine, its side effects, and any warnings that you need to know about.  The pharmacist who fills the prescription can provide more information and answer questions you may have about the medicine.  If you have questions or concerns that the pharmacist cannot address, please call or return to the Emergency Department.

## 2017-05-26 NOTE — ED AVS SNAPSHOT
[facility] Emergency Department    [address]    [facility] [address]    Phone:  [phone]    Fax:  [phone]                                       Ana Jackson   MRN: [id number]    Department:  [facility] Emergency Department   Date of Visit:  5/26/2017           After Visit Summary Signature Page     I have received my discharge instructions, and my questions have been answered. I have discussed any challenges I see with this plan with the nurse or doctor.    ..........................................................................................................................................  Patient/Patient Representative Signature      ..........................................................................................................................................  Patient Representative Print Name and Relationship to Patient    ..................................................               ................................................  Date                                            Time    ..........................................................................................................................................  Reviewed by Signature/Title    ...................................................              ..............................................  Date                                                            Time

## 2017-08-19 ENCOUNTER — HEALTH MAINTENANCE LETTER (OUTPATIENT)
Age: 32
End: 2017-08-19

## 2017-08-19 DIAGNOSIS — Z30.41 ORAL CONTRACEPTIVE PILL SURVEILLANCE: ICD-10-CM

## 2017-08-21 NOTE — TELEPHONE ENCOUNTER
desogestrel-ethinyl estradiol (APRI) 0.15-30 MG-MCG per tablet      Last Written Prescription Date: 3/9/2017  Last Fill Quantity: 84,  # refills: 1   Last Office Visit with [facility], [facility] or [facility] prescribing provider: 1/16/2017

## 2017-08-22 RX ORDER — DESOGESTREL AND ETHINYL ESTRADIOL 0.15-0.03
KIT ORAL
Qty: 84 TABLET | Refills: 1 | Status: SHIPPED | OUTPATIENT
Start: 2017-08-22 | End: 2018-02-05

## 2017-08-22 NOTE — TELEPHONE ENCOUNTER
Prescription approved per [facility] [place] [place] Refill Protocol.    Iliana Rascon RN

## 2018-02-05 ENCOUNTER — OFFICE VISIT (OUTPATIENT)
Dept: PEDIATRICS | Facility: CLINIC | Age: 33
End: 2018-02-05
Payer: COMMERCIAL

## 2018-02-05 VITALS
WEIGHT: 137.25 LBS | BODY MASS INDEX: 22.06 KG/M2 | DIASTOLIC BLOOD PRESSURE: 70 MMHG | HEART RATE: 83 BPM | SYSTOLIC BLOOD PRESSURE: 120 MMHG | TEMPERATURE: 97.6 F | HEIGHT: 66 IN | OXYGEN SATURATION: 99 %

## 2018-02-05 DIAGNOSIS — Z01.419 WELL WOMAN EXAM WITH ROUTINE GYNECOLOGICAL EXAM: Primary | ICD-10-CM

## 2018-02-05 DIAGNOSIS — Z30.41 ORAL CONTRACEPTIVE PILL SURVEILLANCE: ICD-10-CM

## 2018-02-05 DIAGNOSIS — R59.0 LAD (LYMPHADENOPATHY), ANTERIOR CERVICAL: ICD-10-CM

## 2018-02-05 DIAGNOSIS — R87.619 ABNORMAL CERVICAL PAPANICOLAOU SMEAR, UNSPECIFIED ABNORMAL PAP FINDING: ICD-10-CM

## 2018-02-05 LAB
BASOPHILS # BLD AUTO: 0 10E9/L (ref 0–0.2)
BASOPHILS NFR BLD AUTO: 0.5 %
DIFFERENTIAL METHOD BLD: NORMAL
EOSINOPHIL # BLD AUTO: 0.6 10E9/L (ref 0–0.7)
EOSINOPHIL NFR BLD AUTO: 7.4 %
ERYTHROCYTE [DISTWIDTH] IN BLOOD BY AUTOMATED COUNT: 11.8 % (ref 10–15)
HCT VFR BLD AUTO: 38.3 % (ref 35–47)
HGB BLD-MCNC: 12.2 G/DL (ref 11.7–15.7)
LYMPHOCYTES # BLD AUTO: 2.1 10E9/L (ref 0.8–5.3)
LYMPHOCYTES NFR BLD AUTO: 28.2 %
MCH RBC QN AUTO: 30 PG (ref 26.5–33)
MCHC RBC AUTO-ENTMCNC: 31.9 G/DL (ref 31.5–36.5)
MCV RBC AUTO: 94 FL (ref 78–100)
MONOCYTES # BLD AUTO: 0.5 10E9/L (ref 0–1.3)
MONOCYTES NFR BLD AUTO: 7 %
NEUTROPHILS # BLD AUTO: 4.2 10E9/L (ref 1.6–8.3)
NEUTROPHILS NFR BLD AUTO: 56.9 %
PLATELET # BLD AUTO: 308 10E9/L (ref 150–450)
RBC # BLD AUTO: 4.06 10E12/L (ref 3.8–5.2)
WBC # BLD AUTO: 7.4 10E9/L (ref 4–11)

## 2018-02-05 PROCEDURE — 99395 PREV VISIT EST AGE 18-39: CPT | Performed by: INTERNAL MEDICINE

## 2018-02-05 PROCEDURE — 85025 COMPLETE CBC W/AUTO DIFF WBC: CPT | Performed by: INTERNAL MEDICINE

## 2018-02-05 PROCEDURE — G0145 SCR C/V CYTO,THINLAYER,RESCR: HCPCS | Performed by: INTERNAL MEDICINE

## 2018-02-05 PROCEDURE — 87624 HPV HI-RISK TYP POOLED RSLT: CPT | Performed by: INTERNAL MEDICINE

## 2018-02-05 PROCEDURE — 36415 COLL VENOUS BLD VENIPUNCTURE: CPT | Performed by: INTERNAL MEDICINE

## 2018-02-05 RX ORDER — DESOGESTREL AND ETHINYL ESTRADIOL 0.15-0.03
1 KIT ORAL DAILY
Qty: 84 TABLET | Refills: 3 | Status: SHIPPED | OUTPATIENT
Start: 2018-02-05 | End: 2018-06-12

## 2018-02-05 NOTE — PATIENT INSTRUCTIONS
We will check a blood count today. Call if you are still having swelling in that lymph node and I'll send you for an ultrasound to make sure everything looks ok.    Preventive Health Recommendations  Female Ages 26 - 39  Yearly exam:   See your health care provider every year in order to    Review health changes.     Discuss preventive care.      Review your medicines if you your doctor has prescribed any.    Until age 30: Get a Pap test every three years (more often if you have had an abnormal result).    After age 30: Talk to your doctor about whether you should have a Pap test every 3 years or have a Pap test with HPV screening every 5 years.   You do not need a Pap test if your uterus was removed (hysterectomy) and you have not had cancer.  You should be tested each year for STDs (sexually transmitted diseases), if you're at risk.   Talk to your provider about how often to have your cholesterol checked.  If you are at risk for diabetes, you should have a diabetes test (fasting glucose).  Shots: Get a flu shot each year. Get a tetanus shot every 10 years.   Nutrition:     Eat at least 5 servings of fruits and vegetables each day.    Eat whole-grain bread, whole-wheat pasta and brown rice instead of white grains and rice.    Talk to your provider about Calcium and Vitamin D.     Lifestyle    Exercise at least 150 minutes a week (30 minutes a day, 5 days of the week). This will help you control your weight and prevent disease.    Limit alcohol to one drink per day.    No smoking.     Wear sunscreen to prevent skin cancer.    See your dentist every six months for an exam and cleaning.

## 2018-02-05 NOTE — MR AVS SNAPSHOT
After Visit Summary   2/5/2018    Ana Jackson    MRN: [id number]           Patient Information     Date Of Birth          1985        Visit Information        Provider Department      2/5/2018 11:30 AM Chloé Hussein MD [facility]        Today's Diagnoses     Well woman exam with routine gynecological exam    -  1    Oral contraceptive pill surveillance        Abnormal cervical Papanicolaou smear, unspecified abnormal pap finding        LAD (lymphadenopathy), anterior cervical          Care Instructions    We will check a blood count today. Call if you are still having swelling in that lymph node and I'll send you for an ultrasound to make sure everything looks ok.    Preventive Health Recommendations  Female Ages 26 - 39  Yearly exam:   See your health care provider every year in order to    Review health changes.     Discuss preventive care.      Review your medicines if you your doctor has prescribed any.    Until age 30: Get a Pap test every three years (more often if you have had an abnormal result).    After age 30: Talk to your doctor about whether you should have a Pap test every 3 years or have a Pap test with HPV screening every 5 years.   You do not need a Pap test if your uterus was removed (hysterectomy) and you have not had cancer.  You should be tested each year for STDs (sexually transmitted diseases), if you're at risk.   Talk to your provider about how often to have your cholesterol checked.  If you are at risk for diabetes, you should have a diabetes test (fasting glucose).  Shots: Get a flu shot each year. Get a tetanus shot every 10 years.   Nutrition:     Eat at least 5 servings of fruits and vegetables each day.    Eat whole-grain bread, whole-wheat pasta and brown rice instead of white grains and rice.    Talk to your provider about Calcium and Vitamin D.     Lifestyle    Exercise at least 150 minutes a week (30 minutes a day, 5 days of the week). This  "will help you control your weight and prevent disease.    Limit alcohol to one drink per day.    No smoking.     Wear sunscreen to prevent skin cancer.    See your dentist every six months for an exam and cleaning.            Follow-ups after your visit        Who to contact     If you have questions or need follow up information about today's clinic visit or your schedule please contact [facility] [place] directly at [phone].  Normal or non-critical lab and imaging results will be communicated to you by [employer], letter or phone within 4 business days after the clinic has received the results. If you do not hear from us within 7 days, please contact the clinic through "[employer]t or phone. If you have a critical or abnormal lab result, we will notify you by phone as soon as possible.  Submit refill requests through [employer]s [employer] or call your pharmacy and they will forward the refill request to us. Please allow 3 business days for your refill to be completed.          Additional Information About Your Visit        [employer]harSPARQCode Information     ams AG gives you secure access to your electronic health record. If you see a primary care provider, you can also send messages to your care team and make appointments. If you have questions, please call your primary care clinic.  If you do not have a primary care provider, please call [phone] and they will assist you.        Care EveryWhere ID     This is your Care EveryWhere ID. This could be used by other organizations to access your [facility] medical records  [id number]        Your Vitals Were     Pulse Temperature Height Last Period Pulse Oximetry Breastfeeding?    83 97.6  F (36.4  C) (Tympanic) 5' 5.5\" (1.664 m) 01/11/2018 (Approximate) 99% No    BMI (Body Mass Index)                   22.49 kg/m2            Blood Pressure from Last 3 Encounters:   02/05/18 120/70   05/26/17 106/62   01/16/17 110/70    Weight from Last 3 Encounters:   02/05/18 137 lb 4 oz (62.3 kg) "   01/16/17 136 lb 3 oz (61.8 kg)   10/14/16 132 lb 3.2 oz (60 kg)              We Performed the Following     CBC with platelets differential     HPV High Risk Types DNA Cervical     Pap imaged thin layer screen with HPV - recommended age 30 - 65          Today's Medication Changes          These changes are accurate as of 2/5/18 11:51 AM.  If you have any questions, ask your nurse or doctor.               These medicines have changed or have updated prescriptions.        Dose/Directions    desogestrel-ethinyl estradiol 0.15-30 MG-MCG per tablet   Commonly known as:  APRI   This may have changed:  See the new instructions.   Used for:  Oral contraceptive pill surveillance   Changed by:  Chloé Hussein MD        Dose:  1 tablet   Take 1 tablet by mouth daily   Quantity:  84 tablet   Refills:  3            Where to get your medicines      These medications were sent to [facility] - [address] [address]     Phone:  [phone]     desogestrel-ethinyl estradiol 0.15-30 MG-MCG per tablet                Primary Care Provider Office Phone # Fax #    Chloé Hussein [phone] [address] [place] [address]        Equal Access to Services     [facility] NANCY AH: Hadii kimberley urenao Sowilliam, waaxda luqadaha, qaybta kaalmada adeegyada, sallie shukla. So [facility] [phone].    ATENCIÓN: Si habla español, tiene a licea disposición servicios gratuitos de asistencia lingüística. Llame al [phone].    We comply with applicable federal civil rights laws and [place] laws. We do not discriminate on the basis of race, color, national origin, age, disability, sex, sexual orientation, or gender identity.            Thank you!     Thank you for choosing [facility] [place]  for your care. Our goal is always to provide you with excellent care. Hearing back from our patients is one way we can  continue to improve our services. Please take a few minutes to complete the written survey that you may receive in the mail after your visit with us. Thank you!             Your Updated Medication List - Protect others around you: Learn how to safely use, store and throw away your medicines at www.disposemymeds.org.          This list is accurate as of 2/5/18 11:51 AM.  Always use your most recent med list.                   Brand Name Dispense Instructions for use Diagnosis    desogestrel-ethinyl estradiol 0.15-30 MG-MCG per tablet    APRI    84 tablet    Take 1 tablet by mouth daily    Oral contraceptive pill surveillance       loperamide 2 MG tablet    IMODIUM A-D    30 tablet    Take 1 tablet (2 mg) by mouth 4 times daily as needed for diarrhea        ondansetron 4 MG tablet    ZOFRAN    10 tablet    Take 1 tablet (4 mg) by mouth every 8 hours as needed for nausea

## 2018-02-05 NOTE — NURSING NOTE
"Chief Complaint   Patient presents with     Physical       Initial /70 (BP Location: Right arm, Patient Position: Chair, Cuff Size: Adult Regular)  Pulse 83  Temp 97.6  F (36.4  C) (Tympanic)  Ht 5' 5.5\" (1.664 m)  Wt 137 lb 4 oz (62.3 kg)  LMP 01/11/2018 (Approximate)  SpO2 99%  Breastfeeding? No  BMI 22.49 kg/m2 Estimated body mass index is 22.49 kg/(m^2) as calculated from the following:    Height as of this encounter: 5' 5.5\" (1.664 m).    Weight as of this encounter: 137 lb 4 oz (62.3 kg).  Medication Reconciliation: complete     Jerica Lewis MA 2/5/2018 11:29 AM    "

## 2018-02-05 NOTE — LETTER
February 9, 2018    Ana [place]  [address]  [place] [address]    Dear Ana,  We are happy to inform you that your PAP smear result from 2/5/18 is normal.  We are now able to do a follow up test on PAP smears. The DNA test is for HPV (Human Papilloma Virus). Cervical cancer is closely linked with certain types of HPV. Your result showed no evidence of high risk HPV.  Therefore we recommend you return in 5 years for your next pap smear and HPV test.  You will still need to return to the clinic every year for an annual exam and other preventive tests.  Please contact the clinic at [phone] with any questions.  Sincerely,    Chloé Jackson MD/cody

## 2018-02-05 NOTE — PROGRESS NOTES
SUBJECTIVE:   CC: Ana Jackson is an 32 year old woman who presents for preventive health visit.     Physical   Annual:     Getting at least 3 servings of Calcium per day::  Yes    Bi-annual eye exam::  NO    Dental care twice a year::  NO    Sleep apnea or symptoms of sleep apnea::  None    Diet::  Regular (no restrictions)    Frequency of exercise::  2-3 days/week    Duration of exercise::  45-60 minutes    Taking medications regularly::  Yes    Medication side effects::  None    Additional concerns today::  No          Concerns:  Pt states concerns for swollen lymph node on right side x2 weeks. She reports that it actually used to be much more prominent, now improving in size. No overlying redness or warmth, no tenderness. No other lymph nodes. No fevers, chills, weight loss, night sweats. No recent travel or known TB exposures.     Today's PHQ-2 Score:   PHQ-2 ( 1999 Pfizer) 1/16/2017   Q1: Little interest or pleasure in doing things 0   Q2: Feeling down, depressed or hopeless 0   PHQ-2 Score 0       Abuse: Current or Past(Physical, Sexual or Emotional)- No  Do you feel safe in your environment - Yes    Social History   Substance Use Topics     Smoking status: Former Smoker     Packs/day: 0.50     Years: 2.00     Types: Cigarettes     Quit date: 11/1/2005     Smokeless tobacco: Not on file      Comment: NO 2nd hand smoke at work     Alcohol use 0.0 oz/week     0 Standard drinks or equivalent per week      Comment: occ.     No flowsheet data found.    Reviewed orders with patient.  Reviewed health maintenance and updated orders accordingly - Yes  Patient Active Problem List   Diagnosis     Migraine without aura     Excessive or frequent menstruation     CARDIOVASCULAR SCREENING; LDL GOAL LESS THAN 160     Abnormal Pap smear of cervix     Family history of colon cancer     Past Surgical History:   Procedure Laterality Date     HC TOOTH EXTRACTION W/FORCEP         Social History   Substance Use Topics      "Smoking status: Former Smoker     Packs/day: 0.50     Years: 2.00     Types: Cigarettes     Quit date: 11/1/2005     Smokeless tobacco: Never Used      Comment: NO 2nd hand smoke at work     Alcohol use 0.0 oz/week     0 Standard drinks or equivalent per week      Comment: occ.     Family History   Problem Relation Age of Onset     Respiratory Mother      Colon Cancer Mother      CANCER Maternal Grandmother      skin     Respiratory Sister      EXERCISE INDUCED ASTHMA THROUGH ADOLECENCE.     Respiratory Brother      \"     Cancer - colorectal Maternal Aunt 58     Other Cancer Maternal Aunt      rectal     Anesthesia Reaction No family hx of      Blood Disease No family hx of      Neurologic Disorder No family hx of      Breast Cancer No family hx of      HEART DISEASE No family hx of            Mammo discussed, not appropriate for or declined by this patient.    Pertinent mammograms are reviewed under the imaging tab.  History of abnormal Pap smear: NO - age 30-65 PAP every 5 years with negative HPV co-testing recommended    Reviewed and updated as needed this visit by clinical staff  Tobacco  Allergies  Meds  Med Hx  Fam Hx  Soc Hx            Review of Systems  C: NEGATIVE for fever, chills, change in weight  I: NEGATIVE for worrisome rashes, moles or lesions  E: NEGATIVE for vision changes or irritation  ENT: NEGATIVE for ear, mouth and throat problems  R: NEGATIVE for significant cough or SOB  B: NEGATIVE for masses, tenderness or discharge  CV: NEGATIVE for chest pain, palpitations or peripheral edema  GI: NEGATIVE for nausea, abdominal pain, heartburn, or change in bowel habits  : NEGATIVE for unusual urinary or vaginal symptoms. Periods are regular.  M: NEGATIVE for significant arthralgias or myalgia  N: NEGATIVE for weakness, dizziness or paresthesias  P: NEGATIVE for changes in mood or affect     OBJECTIVE:   /70 (BP Location: Right arm, Patient Position: Chair, Cuff Size: Adult Regular)  Pulse " "83  Temp 97.6  F (36.4  C) (Tympanic)  Ht 5' 5.5\" (1.664 m)  Wt 137 lb 4 oz (62.3 kg)  LMP 01/11/2018 (Approximate)  SpO2 99%  Breastfeeding? No  BMI 22.49 kg/m2  Physical Exam  GENERAL: healthy, alert and no distress  EYES: Eyes grossly normal to inspection, PERRL and conjunctivae and sclerae normal  HENT: ear canals and TM's normal, nose and mouth without ulcers or lesions  NECK: R anterior cervical lymph node, approximately 1-1.5cm in size, without overlying warmth or erythema, non-tender and mobile on exam.   RESP: lungs clear to auscultation - no rales, rhonchi or wheezes  BREAST: normal without masses, tenderness or nipple discharge and no palpable axillary masses or adenopathy  CV: regular rate and rhythm, normal S1 S2, no S3 or S4, no murmur  ABDOMEN: soft, nontender, no hepatosplenomegaly, no masses and bowel sounds normal   (female): normal female external genitalia, normal urethral meatus, vaginal mucosa pink, moist, well rugated, and normal cervix without masses or discharge  MS: no gross musculoskeletal defects noted, no edema  SKIN: no suspicious lesions or rashes  NEURO: Normal strength and tone, mentation intact and speech normal  PSYCH: mentation appears normal, affect normal/bright    ASSESSMENT/PLAN:   1. Well woman exam with routine gynecological exam  Counseling as below. Due for pap.   - Pap imaged thin layer screen with HPV - recommended age 30 - 65  - HPV High Risk Types DNA Cervical    2. Oral contraceptive pill surveillance  Reviewed risks of OCP, including increased risk for DVT/CVA. Patient and her  considering getting pregnant soon, will start prenatal vitamin with folic acid and stop OCP when ready.   - desogestrel-ethinyl estradiol (APRI) 0.15-30 MG-MCG per tablet; Take 1 tablet by mouth daily  Dispense: 84 tablet; Refill: 3    3. Abnormal cervical Papanicolaou smear, unspecified abnormal pap finding  Due for pap.   - Pap imaged thin layer screen with HPV - recommended " "age 30 - 65  - HPV High Risk Types DNA Cervical    4. LAD (lymphadenopathy), anterior cervical  Given size will obtain CBC; if nl and lymphadenopathy does not resolve in 2-4 weeks, will obtain US to make sure this isn't a brachial cleft cyst.   - CBC with platelets differential    COUNSELING:  Reviewed preventive health counseling, as reflected in patient instructions  Special attention given to:        Regular exercise       Healthy diet/nutrition       Contraception    BP Screening:   Last 3 BP Readings:    BP Readings from Last 3 Encounters:   02/05/18 120/70   05/26/17 106/62   01/16/17 110/70       The following was recommended to the patient:  Re-screen BP within a year and recommended lifestyle modifications     reports that she quit smoking about 12 years ago. Her smoking use included Cigarettes. She has a 1.00 pack-year smoking history. She has never used smokeless tobacco.    Estimated body mass index is 22.49 kg/(m^2) as calculated from the following:    Height as of this encounter: 5' 5.5\" (1.664 m).    Weight as of this encounter: 137 lb 4 oz (62.3 kg).       Counseling Resources:  ATP IV Guidelines  Pooled Cohorts Equation Calculator  Breast Cancer Risk Calculator  FRAX Risk Assessment  ICSI Preventive Guidelines  Dietary Guidelines for Americans, 2010  [employer]'s MyPlate  ASA Prophylaxis  Lung CA Screening    Chloé Jackson MD  [facility] [place]  Answers for HPI/ROS submitted by the patient on 2/5/2018   PHQ-2 Score: 0    "

## 2018-02-07 LAB
COPATH REPORT: NORMAL
PAP: NORMAL

## 2018-02-08 LAB
FINAL DIAGNOSIS: NORMAL
HPV HR 12 DNA CVX QL NAA+PROBE: NEGATIVE
HPV16 DNA SPEC QL NAA+PROBE: NEGATIVE
HPV18 DNA SPEC QL NAA+PROBE: NEGATIVE
SPECIMEN DESCRIPTION: NORMAL
SPECIMEN SOURCE CVX/VAG CYTO: NORMAL

## 2018-03-08 ENCOUNTER — TELEPHONE (OUTPATIENT)
Dept: PEDIATRICS | Facility: CLINIC | Age: 33
End: 2018-03-08

## 2018-03-08 DIAGNOSIS — R59.0 LAD (LYMPHADENOPATHY) OF RIGHT CERVICAL REGION: Primary | ICD-10-CM

## 2018-03-08 NOTE — TELEPHONE ENCOUNTER
Reason for Call: Request for an order or referral:    Order or referral being requested: US    Date needed: as soon as possible    Has the patient been seen by the PCP for this problem? YES    Additional comments: pt was seen in feb and was told that is the nodes were still swollen to call and make an appt for a US. Order is needed and then please call the pt back with number so she can make the appt.    Phone number Patient can be reached at:  Home number on file [phone] (home)    Best Time:  any    Can we leave a detailed message on this number?  YES    Call taken on 3/8/2018 at 5:31 PM by Tiffanie Cuevas

## 2018-03-09 NOTE — TELEPHONE ENCOUNTER
US ordered. Patient should be called to schedule.     If US demonstrates normal appearing lymph node, will need to obtain further lab work to make sure that there isn't any other underlying infections causing lymphadenopathy, will discuss further when we have US results.    Chloé Hussein MD  Internal Medicine-Pediatrics

## 2018-03-20 ENCOUNTER — HOSPITAL ENCOUNTER (OUTPATIENT)
Dept: ULTRASOUND IMAGING | Facility: CLINIC | Age: 33
Discharge: HOME | End: 2018-03-20
Attending: INTERNAL MEDICINE | Admitting: INTERNAL MEDICINE
Payer: COMMERCIAL

## 2018-03-20 DIAGNOSIS — R59.0 LAD (LYMPHADENOPATHY) OF RIGHT CERVICAL REGION: ICD-10-CM

## 2018-03-20 PROCEDURE — 76536 US EXAM OF HEAD AND NECK: CPT

## 2018-03-27 ENCOUNTER — TELEPHONE (OUTPATIENT)
Dept: PEDIATRICS | Facility: CLINIC | Age: 33
End: 2018-03-27

## 2018-03-27 DIAGNOSIS — R59.0 LAD (LYMPHADENOPATHY), CERVICAL: Primary | ICD-10-CM

## 2018-03-27 NOTE — TELEPHONE ENCOUNTER
Patient returning call from PCP. Do not see a note from today. Will forward to PCP.   Aby Figueroa RN

## 2018-03-27 NOTE — TELEPHONE ENCOUNTER
Discussed results with patient, will refer to ENT for excisional bx of LN.     Chloé Hussein MD  Internal Medicine-Pediatrics

## 2018-06-12 ENCOUNTER — PRENATAL OFFICE VISIT (OUTPATIENT)
Dept: NURSING | Facility: CLINIC | Age: 33
End: 2018-06-12
Payer: COMMERCIAL

## 2018-06-12 VITALS
DIASTOLIC BLOOD PRESSURE: 56 MMHG | WEIGHT: 145 LBS | BODY MASS INDEX: 23.3 KG/M2 | SYSTOLIC BLOOD PRESSURE: 106 MMHG | HEART RATE: 76 BPM | HEIGHT: 66 IN

## 2018-06-12 DIAGNOSIS — Z34.81 ENCOUNTER FOR SUPERVISION OF OTHER NORMAL PREGNANCY IN FIRST TRIMESTER: Primary | ICD-10-CM

## 2018-06-12 PROBLEM — Z34.90 SUPERVISION OF NORMAL PREGNANCY: Status: ACTIVE | Noted: 2018-06-12

## 2018-06-12 LAB
ABO + RH BLD: NORMAL
ABO + RH BLD: NORMAL
BETA HCG QUAL IFA URINE: POSITIVE
BLD GP AB SCN SERPL QL: NORMAL
BLOOD BANK CMNT PATIENT-IMP: NORMAL
ERYTHROCYTE [DISTWIDTH] IN BLOOD BY AUTOMATED COUNT: 11.8 % (ref 10–15)
HCT VFR BLD AUTO: 36.4 % (ref 35–47)
HGB BLD-MCNC: 11.8 G/DL (ref 11.7–15.7)
MCH RBC QN AUTO: 30 PG (ref 26.5–33)
MCHC RBC AUTO-ENTMCNC: 32.4 G/DL (ref 31.5–36.5)
MCV RBC AUTO: 93 FL (ref 78–100)
PLATELET # BLD AUTO: 311 10E9/L (ref 150–450)
RBC # BLD AUTO: 3.93 10E12/L (ref 3.8–5.2)
SPECIMEN EXP DATE BLD: NORMAL
WBC # BLD AUTO: 10.7 10E9/L (ref 4–11)

## 2018-06-12 PROCEDURE — 99207 ZZC NO CHARGE NURSE ONLY: CPT

## 2018-06-12 RX ORDER — PRENATAL VIT/IRON FUM/FOLIC AC 27MG-0.8MG
1 TABLET ORAL DAILY
Qty: 100 TABLET | Refills: 3
Start: 2018-06-12 | End: 2019-02-21

## 2018-06-12 NOTE — MR AVS SNAPSHOT
After Visit Summary   6/12/2018    Ana Jackson    MRN: [id number]           Patient Information     Date Of Birth          1985        Visit Information        Provider Department      6/12/2018 2:15 PM [facility]        Today's Diagnoses     Encounter for supervision of other normal pregnancy in first trimester    -  1       Follow-ups after your visit        Your next 10 appointments already scheduled     Jun 27, 2018  1:15 PM CDT   Ultrasound with RIUS1   [facility]    [address]  [address]  [facility] [id number] [phone]            Jul 23, 2018 10:45 AM CDT   New Prenatal with Emma Price MD   [facility] ([facility])    [address]  [address]  [facility] [address] [phone]              Future tests that were ordered for you today     Open Future Orders        Priority Expected Expires Ordered    US OB < 14 weeks, single,  for dating ([id number]) Routine  9/10/2018 6/12/2018            Who to contact     If you have questions or need follow up information about today's clinic visit or your schedule please contact [facility] directly at [phone].  Normal or non-critical lab and imaging results will be communicated to you by MyChart, letter or phone within 4 business days after the clinic has received the results. If you do not hear from us within 7 days, please contact the clinic through [employer] or phone. If you have a critical or abnormal lab result, we will notify you by phone as soon as possible.  Submit refill requests through "[employer] or call your pharmacy and they will forward the refill request to us. Please allow 3 business days for your refill to be completed.          Additional Information About Your Visit        [employer] Information     "[employer] gives you secure access to your electronic health record. If you see  "a primary care provider, you can also send messages to your care team and make appointments. If you have questions, please call your primary care clinic.  If you do not have a primary care provider, please call [phone] and they will assist you.        Care EveryWhere ID     This is your Care EveryWhere ID. This could be used by other organizations to access your [facility] medical records  [id number]        Your Vitals Were     Pulse Height Last Period BMI (Body Mass Index)          76 5' 5.5\" (1.664 m) 04/30/2018 (Approximate) 23.76 kg/m2         Blood Pressure from Last 3 Encounters:   06/12/18 106/56   02/05/18 120/70   05/26/17 106/62    Weight from Last 3 Encounters:   06/12/18 145 lb (65.8 kg)   02/05/18 137 lb 4 oz (62.3 kg)   01/16/17 136 lb 3 oz (61.8 kg)              We Performed the Following     ABO/RH Type and Screen     Beta HCG Qual, Urine - [facility] and [place] ([id number])     CBC with Platelets     Hepatitis B surface antigen     HIV Antigen Antibody Combo     Rubella Antibody IgG Quantitative     Treponema Abs w Reflex to RPR and Titer     Urine Culture Aerobic Bacterial          Today's Medication Changes          These changes are accurate as of 6/12/18  3:16 PM.  If you have any questions, ask your nurse or doctor.               Start taking these medicines.        Dose/Directions    prenatal multivitamin plus iron 27-0.8 MG Tabs per tablet   Used for:  Encounter for supervision of other normal pregnancy in first trimester        Dose:  1 tablet   Take 1 tablet by mouth daily   Quantity:  100 tablet   Refills:  3            Where to get your medicines      Some of these will need a paper prescription and others can be bought over the counter.  Ask your nurse if you have questions.     You don't need a prescription for these medications     prenatal multivitamin plus iron 27-0.8 MG Tabs per tablet                Primary Care Provider Office Phone # Fax #    Chloé Hussein [phone] " [phone]       [address] [place] [address]        Equal Access to Services     [facility] : Hadii aad ku hadfabiolajuwan Alfredo, wamarvda christopher, qapablota palmiramitchmary gray, sallie lantiguaphillipsherri shukla. So [facility] [phone].    ATENCIÓN: Si habla español, tiene a licea disposición servicios gratuitos de asistencia lingüística. Llame al [phone].    We comply with applicable federal civil rights laws and [place] laws. We do not discriminate on the basis of race, color, national origin, age, disability, sex, sexual orientation, or gender identity.            Thank you!     Thank you for choosing [facility] [place]  for your care. Our goal is always to provide you with excellent care. Hearing back from our patients is one way we can continue to improve our services. Please take a few minutes to complete the written survey that you may receive in the mail after your visit with us. Thank you!             Your Updated Medication List - Protect others around you: Learn how to safely use, store and throw away your medicines at www.disposemymeds.org.          This list is accurate as of 6/12/18  3:16 PM.  Always use your most recent med list.                   Brand Name Dispense Instructions for use Diagnosis    prenatal multivitamin plus iron 27-0.8 MG Tabs per tablet     100 tablet    Take 1 tablet by mouth daily    Encounter for supervision of other normal pregnancy in first trimester

## 2018-06-12 NOTE — PROGRESS NOTES
"Chief Complaint   Patient presents with     Prenatal Care     New Prenatal Nurse Visit   6w1d  Estimated Date of Delivery: 2019      Initial /56 (BP Location: Right arm, Cuff Size: Adult Regular)  Pulse 76  Ht 5' 5.5\" (1.664 m)  Wt 145 lb (65.8 kg)  LMP 2018 (Approximate)  BMI 23.76 kg/m2 Estimated body mass index is 23.76 kg/(m^2) as calculated from the following:    Height as of this encounter: 5' 5.5\" (1.664 m).    Weight as of this encounter: 145 lb (65.8 kg).  BP completed using cuff size: regular    Patient is accompanied by  and 2 daughters. Prenatal book and folder (containing standard educational hand-outs and brochures) given to patient. Information in folder reviewed. Questions answered.     Discussed and gave written information on options available for 1st and 2nd trimester screening, CVS, amniocentesis, AFP, and QUAD screen plus optimal time-frame for testing. Brochure given on optional screening available to determine Cystic Fibrosis carrier status. Pt instructed to check with insurance regarding coverage of these optional tests. Pt advised to call back if she desires testing or has any questions or concerns.    Prenatal labs obtained. New prenatal visit scheduled on 18 with Dr. Price.  Aleida Bennett RN                 "

## 2018-06-13 ENCOUNTER — NURSE TRIAGE (OUTPATIENT)
Dept: NURSING | Facility: CLINIC | Age: 33
End: 2018-06-13

## 2018-06-13 LAB
BACTERIA SPEC CULT: NORMAL
HBV SURFACE AG SERPL QL IA: NONREACTIVE
HIV 1+2 AB+HIV1 P24 AG SERPL QL IA: NONREACTIVE
RUBV IGG SERPL IA-ACNC: 13 IU/ML
SPECIMEN SOURCE: NORMAL
T PALLIDUM AB SER QL: NONREACTIVE

## 2018-06-13 NOTE — TELEPHONE ENCOUNTER
"  Pregnancy less than 20 weeks/ has one episode of vaginal bleeding this AM/ small amount/no other symptoms/ did see the Dr yesterday but did not have a pelvic exam and did not have sex last night/ given home care and sent a note to there  to call her today  Hayden Rivas RN -[phone]  Additional Information    Negative: Shock suspected (e.g., cold/pale/clammy skin, too weak to stand, low BP, rapid pulse)    Negative: Difficult to awaken or acting confused  (e.g., disoriented, slurred speech)    Negative: Passed out (i.e., lost consciousness, collapsed and was not responding)    Negative: Sounds like a life-threatening emergency to the triager    Negative: [1] Vaginal bleeding AND [2] pregnant > 20 weeks    Negative: Not pregnant or pregnancy status unknown    Negative: SEVERE abdominal pain    Negative: [1] SEVERE vaginal bleeding (i.e., soaking 2 pads / hour, large blood clots) AND [2] present 2 or more hours    Negative: [1] MODERATE vaginal bleeding (i.e., soaking 1 pad / hour; clots) AND [2] present > 6 hours    Negative: [1] MODERATE vaginal bleeding (i.e., soaking 1 pad / hour; clots) AND [2] pregnant > 12 weeks    Negative: Passed tissue (e.g., gray-white)    Negative: Shoulder pain    Negative: Pale skin (pallor) of new onset or worsening    Negative: Patient sounds very sick or weak to the triager    Negative: [1] Constant abdominal pain AND [2] present > 2 hours    Negative: Fever > 100.4 F (38.0 C)    Negative: [1] Intermittent lower abdominal pain (e.g., cramping) AND [2] present > 24 hours    Negative: Prior history of \"ectopic pregnancy\" or previous tubal surgery (e.g., tubal ligation)    Negative: Burning with urination    Negative: Has IUD    Negative: MILD vaginal bleeding (i.e., clots or similar to menstrual period; not just spotting)    Negative: SPOTTING lasts > 48 hours or spotting happens more than once in a week    Negative: Unusual vaginal discharge (e.g., bad smelling, yellow, " green, or foamy-white)    Negative: Not feeling pregnant any longer (e.g., breast tenderness or nausea has disappeared)    Negative: SPOTTING after sexual intercourse (single or brief episode) (all triage questions negative)    Negative: SPOTTING after a pelvic examination (single or brief episode) (all triage questions negative)    SPOTTING (single or brief episode) (all triage questions negative)    Protocols used: PREGNANCY - VAGINAL BLEEDING LESS THAN 20 WEEKS [facility]

## 2018-06-14 ENCOUNTER — TELEPHONE (OUTPATIENT)
Dept: OBGYN | Facility: CLINIC | Age: 33
End: 2018-06-14

## 2018-06-14 DIAGNOSIS — O26.859 SPOTTING IN PREGNANCY: ICD-10-CM

## 2018-06-14 LAB
B-HCG SERPL-ACNC: 192 IU/L (ref 0–5)
B-HCG SERPL-ACNC: 79 IU/L (ref 0–5)

## 2018-06-14 PROCEDURE — 84702 CHORIONIC GONADOTROPIN TEST: CPT | Performed by: INTERNAL MEDICINE

## 2018-06-14 PROCEDURE — 36415 COLL VENOUS BLD VENIPUNCTURE: CPT | Performed by: INTERNAL MEDICINE

## 2018-06-14 NOTE — TELEPHONE ENCOUNTER
Spoke with patient regarding HCG quants  Component      Latest Ref Rng & Units 6/12/2018 6/14/2018   HCG Quantitative Serum      0 - 5 IU/L 192 (H) 79 (H)     Informed pt that HCG levels indicate that pregnancy not developing normally.  Most likely miscarriage.   Still having some bright red bleeding with clots and slight cramping.  Recommend:  Ibuprofen for discomfort.  To call back if bleeding becomes heavier, saturated more that 1 pad an hr x 2, increase pain or further concerns.  Offered follow up appt to discuss  Requesting to cancel future appts.    Would you like her to have another HCG quant in a few weeks?  Please advise.  Aleida Bennett RN

## 2018-06-14 NOTE — TELEPHONE ENCOUNTER
Patient calling: states bleeding has increased and is having some cramping.  Has used 2 pads since last night.  Will have HCG quant stat this am.  Aleida Bennett RN

## 2018-06-14 NOTE — TELEPHONE ENCOUNTER
"\"I'm about 6-8 weeks pregnant and I've been spotting, bleeding is increasing\". Caller does not want to go into ER [facility], is wondering if she can wait for clinic lab appointment tomorrow.      Paged on call provider for [facility] to speak to caller at [phone].  Dr. Cifuentes is on call, page sent at 10:02 pm via smart web.  Caller advised to call back if they have not heard back from on call provider within 20 minutes.  Caller appears to understand directives and agrees with plan.    Reason for Disposition    [1] Constant abdominal pain AND [2] present > 2 hours    Additional Information    Negative: Shock suspected (e.g., cold/pale/clammy skin, too weak to stand, low BP, rapid pulse)    Negative: Difficult to awaken or acting confused  (e.g., disoriented, slurred speech)    Negative: Passed out (i.e., lost consciousness, collapsed and was not responding)    Negative: Sounds like a life-threatening emergency to the triager    Negative: [1] Vaginal bleeding AND [2] pregnant > 20 weeks    Negative: Not pregnant or pregnancy status unknown    Negative: SEVERE abdominal pain    Negative: [1] SEVERE vaginal bleeding (i.e., soaking 2 pads / hour, large blood clots) AND [2] present 2 or more hours    Negative: [1] MODERATE vaginal bleeding (i.e., soaking 1 pad / hour; clots) AND [2] present > 6 hours    Negative: [1] MODERATE vaginal bleeding (i.e., soaking 1 pad / hour; clots) AND [2] pregnant > 12 weeks    Negative: Passed tissue (e.g., gray-white)    Negative: Shoulder pain    Negative: Pale skin (pallor) of new onset or worsening    Negative: Patient sounds very sick or weak to the triager    Protocols used: PREGNANCY - VAGINAL BLEEDING LESS THAN 20 WEEKS EGA-ADULT-      Gisela Payton RN  [facility]      "

## 2018-06-18 NOTE — TELEPHONE ENCOUNTER
Spoke with Dr Price regarding message.  Recommend repeating HCG in 2 weeks.  Patient notified. Lab only appt scheduled.  Episode closed.  Aleida Bennett RN

## 2019-02-21 ENCOUNTER — OFFICE VISIT (OUTPATIENT)
Dept: PEDIATRICS | Facility: CLINIC | Age: 34
End: 2019-02-21
Payer: COMMERCIAL

## 2019-02-21 VITALS
HEART RATE: 89 BPM | OXYGEN SATURATION: 97 % | BODY MASS INDEX: 23.76 KG/M2 | SYSTOLIC BLOOD PRESSURE: 110 MMHG | HEIGHT: 65 IN | TEMPERATURE: 98 F | DIASTOLIC BLOOD PRESSURE: 80 MMHG | WEIGHT: 142.6 LBS

## 2019-02-21 DIAGNOSIS — H81.10 BENIGN PAROXYSMAL POSITIONAL VERTIGO, UNSPECIFIED LATERALITY: ICD-10-CM

## 2019-02-21 DIAGNOSIS — Z23 NEED FOR PROPHYLACTIC VACCINATION AND INOCULATION AGAINST INFLUENZA: Primary | ICD-10-CM

## 2019-02-21 PROCEDURE — 90471 IMMUNIZATION ADMIN: CPT | Performed by: PEDIATRICS

## 2019-02-21 PROCEDURE — 99395 PREV VISIT EST AGE 18-39: CPT | Mod: 25 | Performed by: PEDIATRICS

## 2019-02-21 PROCEDURE — 90715 TDAP VACCINE 7 YRS/> IM: CPT | Performed by: PEDIATRICS

## 2019-02-21 RX ORDER — MECLIZINE HYDROCHLORIDE 25 MG/1
25 TABLET ORAL 3 TIMES DAILY PRN
Qty: 20 TABLET | Refills: 0 | Status: SHIPPED | OUTPATIENT
Start: 2019-02-21 | End: 2019-03-03

## 2019-02-21 ASSESSMENT — ENCOUNTER SYMPTOMS
FEVER: 0
SORE THROAT: 0
HEMATURIA: 0
ABDOMINAL PAIN: 0
EYE PAIN: 0
HEADACHES: 0
DIZZINESS: 1
FREQUENCY: 0
NERVOUS/ANXIOUS: 0
ARTHRALGIAS: 0
PARESTHESIAS: 0
WEAKNESS: 0
BREAST MASS: 0
JOINT SWELLING: 0
PALPITATIONS: 0
CONSTIPATION: 0
COUGH: 0
MYALGIAS: 0
HEMATOCHEZIA: 0
SHORTNESS OF BREATH: 0
CHILLS: 0
DYSURIA: 0
DIARRHEA: 0
HEARTBURN: 0
NAUSEA: 0

## 2019-02-21 ASSESSMENT — MIFFLIN-ST. JEOR: SCORE: 1363.32

## 2019-02-21 NOTE — PROGRESS NOTES
SUBJECTIVE:   CC: Ana Jackson is an 33 year old woman who presents for preventive health visit.     Physical   Annual:     Getting at least 3 servings of Calcium per day:  Yes    Bi-annual eye exam:  Yes    Dental care twice a year:  Yes    Sleep apnea or symptoms of sleep apnea:  None    Diet:  Regular (no restrictions)    Frequency of exercise:  2-3 days/week    Duration of exercise:  30-45 minutes    Taking medications regularly:  Not Applicable    Additional concerns today:  Yes    PHQ-2 Total Score: 0      For the past 6 months pt report dizziness with head movement   Derm: spot on left forehead above eyebrow       Concerns today:  1. Hypopigmentation over the left eyebrow. Present x1 year, no previous injury or trauma to the area. No other areas of hypopigmentation. So faint her  can't see it. Of note, the patient's mother has lupus, but no other family history of autoimmune disease.     2. Vertigo symptoms intermittently for the last six months. No inciting trauma. Occurs nearly daily, most with turning her head fast or with rolling over in bed. She denies any ear pain, fullness or ringing. Her symptoms are worse with any sudden movements. She has not tried any medications. No changes in hearing. No nausea. She does have migraines a couple times a month, but this pattern has not changed nor have the migraines been more intense. The migraines have been ongoing since she was a child. No falls.     Today's PHQ-2 Score:   PHQ-2 ( 1999 Pf[employer]) 2/21/2019   Q1: Little interest or pleasure in doing things 0   Q2: Feeling down, depressed or hopeless 0   PHQ-2 Score 0   Q1: Little interest or pleasure in doing things Not at all   Q2: Feeling down, depressed or hopeless Not at all   PHQ-2 Score 0       Abuse: Current or Past(Physical, Sexual or Emotional)- No  Do you feel safe in your environment? Yes    Social History     Tobacco Use     Smoking status: Former Smoker     Packs/day: 0.50     Years:  "2.00     Pack years: 1.00     Types: Cigarettes     Last attempt to quit: 2005     Years since quittin.3     Smokeless tobacco: Never Used     Tobacco comment: NO 2nd hand smoke at work   Substance Use Topics     Alcohol use: Yes     Alcohol/week: 0.0 oz     Comment: 1-2 per week      Alcohol Use 2019   If you drink alcohol do you typically have greater than 3 drinks per day OR greater than 7 drinks per week? No       Reviewed orders with patient.  Reviewed health maintenance and updated orders accordingly - Yes  BP Readings from Last 3 Encounters:   19 110/80   18 106/56   18 120/70    Wt Readings from Last 3 Encounters:   19 64.7 kg (142 lb 9.6 oz)   18 65.8 kg (145 lb)   18 62.3 kg (137 lb 4 oz)                  Patient Active Problem List   Diagnosis     Migraine without aura     Excessive or frequent menstruation     CARDIOVASCULAR SCREENING; LDL GOAL LESS THAN 160     Family history of colon cancer     Supervision of normal pregnancy     Past Surgical History:   Procedure Laterality Date     HC TOOTH EXTRACTION W/FORCEP       LYMPH NODE BIOPSY  2018       Social History     Tobacco Use     Smoking status: Former Smoker     Packs/day: 0.50     Years: 2.00     Pack years: 1.00     Types: Cigarettes     Last attempt to quit: 2005     Years since quittin.3     Smokeless tobacco: Never Used     Tobacco comment: NO 2nd hand smoke at work   Substance Use Topics     Alcohol use: Yes     Alcohol/week: 0.0 oz     Comment: 1-2 per week      Family History   Problem Relation Age of Onset     Respiratory Mother      Colon Cancer Mother      Cancer Maternal Grandmother         skin     Respiratory Sister         EXERCISE INDUCED ASTHMA THROUGH ADOLECENCE.     Respiratory Brother         \"     Cancer - colorectal Maternal Aunt 58     Other Cancer Maternal Aunt         rectal     Anesthesia Reaction No family hx of      Blood Disease No family hx of      " Neurologic Disorder No family hx of      Breast Cancer No family hx of      Heart Disease No family hx of          Current Outpatient Medications   Medication Sig Dispense Refill     meclizine (ANTIVERT) 25 MG tablet Take 1 tablet (25 mg) by mouth 3 times daily as needed for dizziness 20 tablet 0       Mammogram not appropriate for this patient based on age.    Pertinent mammograms are reviewed under the imaging tab.  History of abnormal Pap smear: NO - age 30- 65 PAP every 3 years recommended  NO - age 30-65 PAP every 5 years with negative HPV co-testing recommended  PAP / HPV Latest Ref Rng & Units 2/5/2018 6/17/2016 10/26/2015   PAP - NIL NIL NIL   HPV 16 DNA NEG:Negative Negative Negative Negative   HPV 18 DNA NEG:Negative Negative Negative Negative   OTHER HR HPV NEG:Negative Negative Negative Negative     Reviewed and updated as needed this visit by clinical staff  Tobacco  Allergies  Meds  Med Hx  Surg Hx  Fam Hx  Soc Hx      Review of Systems   Constitutional: Negative for chills and fever.   HENT: Negative for congestion, ear pain, hearing loss and sore throat.    Eyes: Negative for pain and visual disturbance.   Respiratory: Negative for cough and shortness of breath.    Cardiovascular: Negative for chest pain, palpitations and peripheral edema.   Gastrointestinal: Negative for abdominal pain, constipation, diarrhea, heartburn, hematochezia and nausea.   Breasts:  Negative for tenderness, breast mass and discharge.   Genitourinary: Negative for dysuria, frequency, genital sores, hematuria, pelvic pain, urgency, vaginal bleeding and vaginal discharge.   Musculoskeletal: Negative for arthralgias, joint swelling and myalgias.   Skin: Negative for rash.   Neurological: Positive for dizziness. Negative for weakness, headaches and paresthesias.   Psychiatric/Behavioral: Negative for mood changes. The patient is not nervous/anxious.         OBJECTIVE:   /80   Pulse 89   Temp 98  F (36.7  C) (Oral)   " Ht 1.66 m (5' 5.35\")   Wt 64.7 kg (142 lb 9.6 oz)   SpO2 97%   BMI 23.47 kg/m    Physical Exam  Exam:  General: the patient is pleasant, resting comfortably, no acute distress.   HEENT: Normocephalic, atraumatic. TMs and canals are normal. No nasal discharge or deformity. MMM, no oral lesions, age appropriate dentition. No scleral icterus or conjunctivitis, PERRL.   Neck: supple, full range of motion, no masses  Lymph: no cervical lymphadenopathy  Lungs: Clear to auscultation, no wheezes or crackles.   CV: RRR, nl s1 and s2, no m/r/g.   Abdomen: Soft, nondistended, nontender. No rebound or guarding. Bowel sounds active.  Extremities: No lower extremity edema. Peripheral pulses strong and symmetric.   Skin: no appreciable skin lesions/rashes on exposed skin.   Neuro: Alert and oriented x4, grossly normal neurologic exam. CN II-XII intact. Upper and lower extremity strength symmetric and 5/5. DTRs at patella and achilles 2+ and symmetric. Normal gait. No nystagmus. Unable to ellicit symptoms with Evi-Hallpike maneuver.    Diagnostic Test Results:  none     ASSESSMENT/PLAN:   1. Need for prophylactic vaccination and inoculation against influenza  - TDAP VACCINE (ADACEL)    2. Benign paroxysmal positional vertigo, unspecified laterality  32 yo woman with six months of vertigo, nearly daily, not affected work. Symptoms occur with movement of the head consistent with BPPV but unable to elicit symptoms with [place]-Hallpike maneuvers. No signs of symptoms to suggest more concerning pathology. Reassured by normal neuro exam and no change in headache pattern. I think it is reasonable given duration of symptoms to have her evaluated in vestibular rehab for further care and evaluation. Will also trial meclizine.    - [facility]; Future    - meclizine (ANTIVERT) 25 MG tablet; Take 1 tablet (25 mg) by mouth 3 times daily as needed for dizziness  Dispense: 20 tablet; Refill: 0    3. Forehead " "hypopigmentation  I do not see any significant hypopigmentation. Differential includes vitiligo (positive family h/o autoimmune disease) vs tinea versicolor vs other benign skin infection.     - Discussed monitoring vs dermatology consult, patient opted for monitoring    COUNSELING:  Reviewed preventive health counseling, as reflected in patient instructions       Regular exercise       Healthy diet/nutrition       Immunizations    Vaccinated for: Influenza             Contraception    BP Readings from Last 1 Encounters:   02/21/19 110/80     Estimated body mass index is 23.47 kg/m  as calculated from the following:    Height as of this encounter: 1.66 m (5' 5.35\").    Weight as of this encounter: 64.7 kg (142 lb 9.6 oz).           reports that she quit smoking about 13 years ago. Her smoking use included cigarettes. She has a 1.00 pack-year smoking history. she has never used smokeless tobacco.      Counseling Resources:  ATP IV Guidelines  Pooled Cohorts Equation Calculator  Breast Cancer Risk Calculator  FRAX Risk Assessment  ICSI Preventive Guidelines  Dietary Guidelines for Americans, 2010  USDA's MyPlate  ASA Prophylaxis  Lung CA Screening    Johanna Mckeon MD  [facility]  "

## 2019-04-07 ENCOUNTER — OFFICE VISIT (OUTPATIENT)
Dept: URGENT CARE | Facility: URGENT CARE | Age: 34
End: 2019-04-07
Payer: COMMERCIAL

## 2019-04-07 VITALS
HEART RATE: 117 BPM | HEIGHT: 65 IN | DIASTOLIC BLOOD PRESSURE: 80 MMHG | SYSTOLIC BLOOD PRESSURE: 118 MMHG | BODY MASS INDEX: 23.57 KG/M2 | OXYGEN SATURATION: 99 % | TEMPERATURE: 98.7 F | WEIGHT: 141.5 LBS

## 2019-04-07 DIAGNOSIS — N39.0 URINARY TRACT INFECTION WITH HEMATURIA, SITE UNSPECIFIED: Primary | ICD-10-CM

## 2019-04-07 DIAGNOSIS — R31.9 URINARY TRACT INFECTION WITH HEMATURIA, SITE UNSPECIFIED: Primary | ICD-10-CM

## 2019-04-07 DIAGNOSIS — R30.0 DYSURIA: ICD-10-CM

## 2019-04-07 LAB
ALBUMIN UR-MCNC: >=300 MG/DL
APPEARANCE UR: ABNORMAL
BACTERIA #/AREA URNS HPF: ABNORMAL /HPF
BILIRUB UR QL STRIP: NEGATIVE
COLOR UR AUTO: ABNORMAL
GLUCOSE UR STRIP-MCNC: NEGATIVE MG/DL
HGB UR QL STRIP: ABNORMAL
KETONES UR STRIP-MCNC: NEGATIVE MG/DL
LEUKOCYTE ESTERASE UR QL STRIP: ABNORMAL
NITRATE UR QL: NEGATIVE
NON-SQ EPI CELLS #/AREA URNS LPF: ABNORMAL /LPF
PH UR STRIP: 7 PH (ref 5–7)
RBC #/AREA URNS AUTO: ABNORMAL /HPF
SOURCE: ABNORMAL
SP GR UR STRIP: 1.01 (ref 1–1.03)
UROBILINOGEN UR STRIP-ACNC: 0.2 EU/DL (ref 0.2–1)
WBC #/AREA URNS AUTO: >100 /HPF

## 2019-04-07 PROCEDURE — 99213 OFFICE O/P EST LOW 20 MIN: CPT | Performed by: PHYSICIAN ASSISTANT

## 2019-04-07 PROCEDURE — 87186 SC STD MICRODIL/AGAR DIL: CPT | Performed by: PHYSICIAN ASSISTANT

## 2019-04-07 PROCEDURE — 87086 URINE CULTURE/COLONY COUNT: CPT | Performed by: PHYSICIAN ASSISTANT

## 2019-04-07 PROCEDURE — 81001 URINALYSIS AUTO W/SCOPE: CPT | Performed by: PHYSICIAN ASSISTANT

## 2019-04-07 PROCEDURE — 87088 URINE BACTERIA CULTURE: CPT | Performed by: PHYSICIAN ASSISTANT

## 2019-04-07 RX ORDER — SULFAMETHOXAZOLE/TRIMETHOPRIM 800-160 MG
1 TABLET ORAL 2 TIMES DAILY
Qty: 10 TABLET | Refills: 0 | Status: SHIPPED | OUTPATIENT
Start: 2019-04-07 | End: 2019-04-12

## 2019-04-07 RX ORDER — FLUCONAZOLE 150 MG/1
150 TABLET ORAL
Qty: 2 TABLET | Refills: 0 | Status: SHIPPED | OUTPATIENT
Start: 2019-04-07 | End: 2020-01-21

## 2019-04-07 ASSESSMENT — MIFFLIN-ST. JEOR: SCORE: 1352.72

## 2019-04-07 NOTE — PROGRESS NOTES
"SUBJECTIVE:   Ana Jackson is a 33 year old female who  presents today for a possible UTI. Symptoms of dysuria, frequency, burning and suprapubic pain and pressure have been going on for 1day(s).  Hematuria yes this morning .  sudden onsetand moderate.  There is no history of fever, chills, nausea or vomiting.  No history of vaginal discharge. This patient does  have a history of urinary tract infections. Patient denies long duration, rigors, flank pain, temperature > 101 degrees F., Vomiting, significant nausea or diarrhea, taking Coumadin and GFR less than 30 within the last year or vaginal discharge, vaginal odor, vaginal itching and dyspareunia (pain in labia/pelvis)     Hx of UTI issues    Past Medical History:   Diagnosis Date     Closed fracture of unspecified part of forearm     (L)     Herpangina 89     Migraine, unspecified, without mention of intractable migraine without mention of status migrainosus      No current outpatient medications on file.     Social History     Tobacco Use     Smoking status: Former Smoker     Packs/day: 0.50     Years: 2.00     Pack years: 1.00     Types: Cigarettes     Last attempt to quit: 2005     Years since quittin.4     Smokeless tobacco: Never Used     Tobacco comment: NO 2nd hand smoke at work   Substance Use Topics     Alcohol use: Yes     Alcohol/week: 0.0 oz     Comment: 1-2 per week        ROS:   Review of systems negative except as stated above.    OBJECTIVE:  /80 (BP Location: Right arm, Patient Position: Chair, Cuff Size: Adult Regular)   Pulse 117   Temp 98.7  F (37.1  C) (Oral)   Ht 1.651 m (5' 5\")   Wt 64.2 kg (141 lb 8 oz)   LMP 03/15/2019   SpO2 99%   BMI 23.55 kg/m    GENERAL APPEARANCE: healthy, alert and no distress  RESP: lungs clear to auscultation - no rales, rhonchi or wheezes  CV: regular rates and rhythm, normal S1 S2, no murmur noted  ABDOMEN:  soft, nontender, no HSM or masses and bowel sounds normal  BACK: No " CVA tenderness  SKIN: no suspicious lesions or rashes    Results for orders placed or performed in visit on 04/07/19   UA reflex to Microscopic and Culture   Result Value Ref Range    Color Urine Red     Appearance Urine Slightly Cloudy     Glucose Urine Negative NEG^Negative mg/dL    Bilirubin Urine Negative NEG^Negative    Ketones Urine Negative NEG^Negative mg/dL    Specific Gravity Urine 1.010 1.003 - 1.035    Blood Urine Large (A) NEG^Negative    pH Urine 7.0 5.0 - 7.0 pH    Protein Albumin Urine >=300 (A) NEG^Negative mg/dL    Urobilinogen Urine 0.2 0.2 - 1.0 EU/dL    Nitrite Urine Negative NEG^Negative    Leukocyte Esterase Urine Large (A) NEG^Negative    Source Midstream Urine    Urine Microscopic   Result Value Ref Range    WBC Urine >100 (A) OTO5^0 - 5 /HPF    RBC Urine  (A) OTO2^O - 2 /HPF    Squamous Epithelial /LPF Urine Few FEW^Few /LPF    Bacteria Urine Many (A) NEG^Negative /HPF         ASSESSMENT:   Lower, uncomplicated urinary tract infection.    PLAN:  Culture pending and Bactrim as directed . Diflucan if needed.    Drink plenty of fluids.  Prevention and treatment of UTI's discussed.Signs and symptoms of pyelonephritis mentioned.  Follow up with primary care physician if not improving

## 2019-04-08 LAB
BACTERIA SPEC CULT: ABNORMAL
BACTERIA SPEC CULT: ABNORMAL
SPECIMEN SOURCE: ABNORMAL

## 2019-10-04 ENCOUNTER — HEALTH MAINTENANCE LETTER (OUTPATIENT)
Age: 34
End: 2019-10-04

## 2019-11-07 ENCOUNTER — VIRTUAL VISIT (OUTPATIENT)
Dept: FAMILY MEDICINE | Facility: OTHER | Age: 34
End: 2019-11-07

## 2019-11-07 NOTE — PROGRESS NOTES
"Date: 2019 08:31:51  Clinician: Bennett Somers  Clinician NPI: [id number]  Patient: Ana Jackson  Patient : 1985  Patient Address: [facility] [place] [address]  Patient Phone: [phone]  Visit Protocol: UTI  Patient Summary:  Ana is a 34 year old ( : 1985 ) female who initiated a Visit for a presumed bladder infection. When asked the question \"Please sign me up to receive news, health information and promotions. \", Ana responded \"No\".   Her symptoms started 1-3 days ago and consist of urinary frequency, vaginal discharge, urgency, vaginal itching, dysuria, and feeling as if the bladder is never empty.   Symptom details     Urine color: The color of her urine is yellow.     Vaginal discharge: The discharge is clear in color and is smooth. The discharge is not typical for her.      Denied symptoms include chills, urinary incontinence, vomiting, foul-smelling urine, nausea, flank pain, and abdominal pain. She does not feel feverish.   Ana has not used any over-the-counter medications or home remedies to relieve her current symptoms.  Precipitating events  Ana denies having a sexually transmitted disease.  Pertinent medical history  Ana has had a bladder infection before and has had 1 in the past 12 months. Her most recent bladder infection was not within the last 4 weeks. Her current symptoms are similar to her previous bladder infection symptoms.   She is not sure what antibiotics have been effective in treating her past bladder infections.   Ana has not been prescribed antibiotics to prevent frequent or repeated bladder infections in the past and does not get yeast infections when she takes antibiotics. She has not experienced problems or side effects with any of the common antibiotics used to treat bladder infections.   Ana does not have a history of kidney stones. She has not used a catheter or been a patient in a hospital or nursing home " in the past 2 weeks.   Ana does not smoke or use smokeless tobacco.   She denies pregnancy and denies breastfeeding. She has menstruated in the past month.     MEDICATIONS: No current medications, ALLERGIES: NKDA  Clinician Response:  Dear Ana,  Based on the information you have provided, you likely have an acute urinary tract infection, also called a bladder infection. Bladder infections occur when bacteria from the outside of the body enters the urinary tract. Any part of the urinary system can be infected, but the bladder is the most common.  Medication information  I am prescribing:     Nitrofurantoin monohyd/m-cryst (Macrobid) 100 mg oral capsule. Take 1 capsule by mouth every 12 hours for 5 days. Take this medication with food. There are no refills with this prescription.   The medication I prescribed for your bladder infection is an antibiotic. Continue taking the medication until it is gone even if you feel better.   Yeast infections can be a common side effect of antibiotics. The most common symptom of a yeast infection is itchiness in and around the vagina. Other signs and symptoms include burning, redness, or a thick, white vaginal discharge that looks like cottage cheese and does not have a bad smell.  Self care  Urination helps to flush bacteria from the urinary tract. For this reason, drinking water and urinating often helps relieve some urinary symptoms and can decrease your risk of getting bladder infections in the future.  Other steps you can take to prevent future bladder infections include:     Wipe front to back after using the bathroom    Urinate after sexual intercourse    Avoid using deodorant sprays, douches, or powders in the vaginal area     When to seek care  Please make an appointment to be seen in a clinic or urgent care if any of the following occur:     You develop new symptoms or your symptoms become worse    You have medication side effects that make it difficult to take  them as prescribed    Your symptoms do not improve within 1-2 days of starting treatment    You have symptoms of a bladder infection that return shortly after completing treatment     It is possible to have an allergic reaction to an antibiotic even if you have not had one in the past. If you notice a new rash, significant swelling, or difficulty breathing, stop taking this medication immediately and go to a clinic or urgent care.   Diagnosis: Acute uncomplicated bladder infection  Diagnosis ICD: N39.0  Prescription: nitrofurantoin monohyd/m-cryst (Macrobid) 100 mg oral capsule 10 capsule, 5 days supply. Take 1 capsule by mouth every 12 hours for 5 days. Refills: 0, Refill as needed: no, Allow substitutions: yes  Pharmacy: [facility]/pharmacy #[id number] - [phone] - [id number], [place] [address]

## 2019-12-03 NOTE — PROGRESS NOTES
"Date: 2019 08:31:51  Clinician: Bennett Somers  Clinician NPI: [id number]  Patient: Ana Jackson  Patient : 1985  Patient Address: [facility] [place] [address] [address]  Patient Phone: [phone]  Visit Protocol: UTI  Patient Summary:  Ana is a 34 year old ( : 1985 ) female who initiated a Visit for a presumed bladder infection. When asked the question \"Please sign me up to receive news, health information and promotions. \", Ana responded \"No\".   Her symptoms started 1-3 days ago and consist of urinary frequency, vaginal discharge, urgency, vaginal itching, dysuria, and feeling as if the bladder is never empty.   Symptom details     Urine color: The color of her urine is yellow.     Vaginal discharge: The discharge is clear in color and is smooth. The discharge is not typical for her.      Denied symptoms include chills, urinary incontinence, vomiting, foul-smelling urine, nausea, flank pain, and abdominal pain. She does not feel feverish.   Ana has not used any over-the-counter medications or home remedies to relieve her current symptoms.  Precipitating events  Ana denies having a sexually transmitted disease.  Pertinent medical history  Ana has had a bladder infection before and has had 1 in the past 12 months. Her most recent bladder infection was not within the last 4 weeks. Her current symptoms are similar to her previous bladder infection symptoms.   She is not sure what antibiotics have been effective in treating her past bladder infections.   Ana has not been prescribed antibiotics to prevent frequent or repeated bladder infections in the past and does not get yeast infections when she takes antibiotics. She has not experienced problems or side effects with any of the common antibiotics used to treat bladder infections.   Ana does not have a history of kidney stones. She has not used a catheter or been a patient in a hospital or nursing home " in the past 2 weeks.   Ana does not smoke or use smokeless tobacco.   She denies pregnancy and denies breastfeeding. She has menstruated in the past month.     MEDICATIONS: No current medications, ALLERGIES: NKDA  Clinician Response:  Dear Ana,  Based on the information you have provided, you likely have an acute urinary tract infection, also called a bladder infection. Bladder infections occur when bacteria from the outside of the body enters the urinary tract. Any part of the urinary system can be infected, but the bladder is the most common.  Medication information  I am prescribing:     Nitrofurantoin monohyd/m-cryst (Macrobid) 100 mg oral capsule. Take 1 capsule by mouth every 12 hours for 5 days. Take this medication with food. There are no refills with this prescription.   The medication I prescribed for your bladder infection is an antibiotic. Continue taking the medication until it is gone even if you feel better.   Yeast infections can be a common side effect of antibiotics. The most common symptom of a yeast infection is itchiness in and around the vagina. Other signs and symptoms include burning, redness, or a thick, white vaginal discharge that looks like cottage cheese and does not have a bad smell.  Self care  Urination helps to flush bacteria from the urinary tract. For this reason, drinking water and urinating often helps relieve some urinary symptoms and can decrease your risk of getting bladder infections in the future.  Other steps you can take to prevent future bladder infections include:     Wipe front to back after using the bathroom    Urinate after sexual intercourse    Avoid using deodorant sprays, douches, or powders in the vaginal area     When to seek care  Please make an appointment to be seen in a clinic or urgent care if any of the following occur:     You develop new symptoms or your symptoms become worse    You have medication side effects that make it difficult to take  them as prescribed    Your symptoms do not improve within 1-2 days of starting treatment    You have symptoms of a bladder infection that return shortly after completing treatment     It is possible to have an allergic reaction to an antibiotic even if you have not had one in the past. If you notice a new rash, significant swelling, or difficulty breathing, stop taking this medication immediately and go to a clinic or urgent care.   Diagnosis: Acute uncomplicated bladder infection  Diagnosis ICD: N39.0  Prescription: nitrofurantoin monohyd/m-cryst (Macrobid) 100 mg oral capsule 10 capsule, 5 days supply. Take 1 capsule by mouth every 12 hours for 5 days. Refills: 0, Refill as needed: no, Allow substitutions: yes  Pharmacy: [facility]/pharmacy #[id number] - [phone] - [phone], [place] [id number]

## 2020-01-04 ENCOUNTER — TELEPHONE (OUTPATIENT)
Dept: PEDIATRICS | Facility: CLINIC | Age: 35
End: 2020-01-04

## 2020-01-04 NOTE — TELEPHONE ENCOUNTER
Please call and schedule patient.      TC--can you please find out why the [facility] provider pool keeps getting these messages?     Eldon Kelly PA-C

## 2020-01-06 NOTE — TELEPHONE ENCOUNTER
Pt is currently scheduled on 1/7/20 at 2:45pm with Samia Ray.     Abby Clay,     on 1/6/2020 at 9:18 AM

## 2020-01-21 ENCOUNTER — PRENATAL OFFICE VISIT (OUTPATIENT)
Dept: NURSING | Facility: CLINIC | Age: 35
End: 2020-01-21
Payer: COMMERCIAL

## 2020-01-21 VITALS
HEART RATE: 84 BPM | SYSTOLIC BLOOD PRESSURE: 106 MMHG | DIASTOLIC BLOOD PRESSURE: 58 MMHG | HEIGHT: 66 IN | WEIGHT: 125 LBS | BODY MASS INDEX: 20.09 KG/M2

## 2020-01-21 DIAGNOSIS — Z34.81 ENCOUNTER FOR SUPERVISION OF OTHER NORMAL PREGNANCY IN FIRST TRIMESTER: Primary | ICD-10-CM

## 2020-01-21 LAB
ABO + RH BLD: NORMAL
ABO + RH BLD: NORMAL
BLD GP AB SCN SERPL QL: NORMAL
BLOOD BANK CMNT PATIENT-IMP: NORMAL
ERYTHROCYTE [DISTWIDTH] IN BLOOD BY AUTOMATED COUNT: 16.2 % (ref 10–15)
HCT VFR BLD AUTO: 33.1 % (ref 35–47)
HGB BLD-MCNC: 10.6 G/DL (ref 11.7–15.7)
MCH RBC QN AUTO: 26.5 PG (ref 26.5–33)
MCHC RBC AUTO-ENTMCNC: 32 G/DL (ref 31.5–36.5)
MCV RBC AUTO: 83 FL (ref 78–100)
PLATELET # BLD AUTO: 306 10E9/L (ref 150–450)
RBC # BLD AUTO: 4 10E12/L (ref 3.8–5.2)
SPECIMEN EXP DATE BLD: NORMAL
WBC # BLD AUTO: 8.1 10E9/L (ref 4–11)

## 2020-01-21 PROCEDURE — 87491 CHLMYD TRACH DNA AMP PROBE: CPT | Performed by: OBSTETRICS & GYNECOLOGY

## 2020-01-21 PROCEDURE — 87086 URINE CULTURE/COLONY COUNT: CPT | Performed by: OBSTETRICS & GYNECOLOGY

## 2020-01-21 PROCEDURE — 87340 HEPATITIS B SURFACE AG IA: CPT | Performed by: OBSTETRICS & GYNECOLOGY

## 2020-01-21 PROCEDURE — 86850 RBC ANTIBODY SCREEN: CPT | Performed by: OBSTETRICS & GYNECOLOGY

## 2020-01-21 PROCEDURE — 87389 HIV-1 AG W/HIV-1&-2 AB AG IA: CPT | Performed by: OBSTETRICS & GYNECOLOGY

## 2020-01-21 PROCEDURE — 86900 BLOOD TYPING SEROLOGIC ABO: CPT | Performed by: OBSTETRICS & GYNECOLOGY

## 2020-01-21 PROCEDURE — 99207 ZZC NO CHARGE NURSE ONLY: CPT

## 2020-01-21 PROCEDURE — 86780 TREPONEMA PALLIDUM: CPT | Performed by: OBSTETRICS & GYNECOLOGY

## 2020-01-21 PROCEDURE — 85027 COMPLETE CBC AUTOMATED: CPT | Performed by: OBSTETRICS & GYNECOLOGY

## 2020-01-21 PROCEDURE — 36415 COLL VENOUS BLD VENIPUNCTURE: CPT | Performed by: OBSTETRICS & GYNECOLOGY

## 2020-01-21 PROCEDURE — 86762 RUBELLA ANTIBODY: CPT | Performed by: OBSTETRICS & GYNECOLOGY

## 2020-01-21 PROCEDURE — 87591 N.GONORRHOEAE DNA AMP PROB: CPT | Performed by: OBSTETRICS & GYNECOLOGY

## 2020-01-21 PROCEDURE — 86901 BLOOD TYPING SEROLOGIC RH(D): CPT | Performed by: OBSTETRICS & GYNECOLOGY

## 2020-01-21 RX ORDER — PRENATAL VIT/IRON FUM/FOLIC AC 27MG-0.8MG
1 TABLET ORAL DAILY
Qty: 90 TABLET | Refills: 3
Start: 2020-01-21 | End: 2022-07-14

## 2020-01-21 SDOH — ECONOMIC STABILITY: FOOD INSECURITY: WITHIN THE PAST 12 MONTHS, YOU WORRIED THAT YOUR FOOD WOULD RUN OUT BEFORE YOU GOT MONEY TO BUY MORE.: NEVER TRUE

## 2020-01-21 SDOH — ECONOMIC STABILITY: TRANSPORTATION INSECURITY
IN THE PAST 12 MONTHS, HAS THE LACK OF TRANSPORTATION KEPT YOU FROM MEDICAL APPOINTMENTS OR FROM GETTING MEDICATIONS?: NO

## 2020-01-21 SDOH — ECONOMIC STABILITY: TRANSPORTATION INSECURITY
IN THE PAST 12 MONTHS, HAS LACK OF TRANSPORTATION KEPT YOU FROM MEETINGS, WORK, OR FROM GETTING THINGS NEEDED FOR DAILY LIVING?: NO

## 2020-01-21 SDOH — ECONOMIC STABILITY: FOOD INSECURITY: WITHIN THE PAST 12 MONTHS, THE FOOD YOU BOUGHT JUST DIDN'T LAST AND YOU DIDN'T HAVE MONEY TO GET MORE.: NEVER TRUE

## 2020-01-21 SDOH — ECONOMIC STABILITY: INCOME INSECURITY: HOW HARD IS IT FOR YOU TO PAY FOR THE VERY BASICS LIKE FOOD, HOUSING, MEDICAL CARE, AND HEATING?: NOT HARD AT ALL

## 2020-01-21 ASSESSMENT — MIFFLIN-ST. JEOR: SCORE: 1280.81

## 2020-01-21 NOTE — PROGRESS NOTES
"Chief Complaint   Patient presents with     Prenatal Care     New Prenatal Nurse Vist   9w1d    Estimated Date of Delivery: Aug 24, 2020    Initial /58 (BP Location: Right arm, Cuff Size: Adult Regular)   Pulse 84   Ht 1.664 m (5' 5.5\")   Wt 56.7 kg (125 lb)   LMP 2019 (Exact Date)   BMI 20.48 kg/m   Estimated body mass index is 20.48 kg/m  as calculated from the following:    Height as of this encounter: 1.664 m (5' 5.5\").    Weight as of this encounter: 56.7 kg (125 lb).  BP completed using cuff size: regular    Questioned patient about current smoking habits.  Pt. quit smoking some time ago.        Prenatal book and folder (containing standard educational hand-outs and brochures) given to patient. Information in folder reviewed. Questions answered. Brochure given on optional screening available to assess chromosomal anomalies. Pt advised to call the clinic if she has any questions or concerns related to her pregnancy. Prenatal labs obtained. New prenatal visit scheduled on 2/10/20 with Dr Price.        Lab Results   Component Value Date    PAP NIL 2018           Patient supplied answers from flow sheet for:  Prenatal OB Questionnaire.  Past Medical History  Diabetes?: No  Hypertension : No  Heart disease, mitral valve prolapse or rheumatic fever?: No  An autoimmune disease such as lupus or rheumatoid arthritis?: No  Kidney disease or urinary tract infection?: No  Epilepsy, seizures or spells?: No  Migraine headaches?: (!) Yes  A stroke or loss of function or sensation?: No  Any other neurological problems?: No  Have you ever been treated for depression?: No  Are you having problems with crying spells or loss of self-esteem?: No  Have you ever required psychiatric care?: No  Have you ever had hepatitis, liver disease or jaundice?: No  Have you been treated for blood clots in your veins, deep vein thromosis, inflammation in the veins, thrombosis, phlebitis, pulmonary embolism or " varicosities?: No  Have you had excessive bleeding after surgery or dental work?: No  Do you bleed more than other women after a cut or scratch?: No  Do you have a history of anemia?: (!) Yes(in high school)  Have you ever had thyroid problems or taken thyroid medication?: No   Do you have any endocrine problems?: No  Have you ever been in a major accident or suffered serious trauma?: No  Within the last year, has anyone hit, slapped, kicked or otherwise hurt you?: No  In the last year, has anyone forced you to have sex when you didn't want to?: No    Past Medical History 2   Have you ever received a blood transfusion?: No  Would you refuse a blood transfusion if a doctor judged it to be medically necessary?: No   If you answered Yes, would you rather die than receive a blood transfusion?: No  If you answered Yes, is this for Roman Catholic reasons?: No  Does anyone in your home smoke?: No  Do you use tobacco products?: No  Do you drink beer, wine or hard liquor?: No  Do you use any of the following: marijuana, speed, cocaine, heroin, hallucinogens or other drugs?: No   Is your blood type Rh negative?: No  Have you ever had abnormal antibodies in your blood?: No  Have you ever had asthma?: No  Have you ever had tuberculosis?: No  Do you have any allergies to drugs or over-the-counter medications?: No  Allergies: Dust Mites, Aspartame, Ethanol, Venlafaxine, Hydrochloride, Sertraline: No  Have you had any breast problems?: No  Have you ever ?: (!) Yes  Have you had any gynecological surgical procedures such as cervical conization, a LEEP procedure, laser treatment, cryosurgery of the cervix or a dilation and curettage, etc?: No  Have you ever had any other surgical procedures?: (!) Yes(see surgical history)  Have you been hospitalized for a nonsurgical reason excluding normal delivery?: No  Have you ever had any anesthetic complications?: No  Have you ever had an abnormal pap smear?: (!) Yes(about 5 yrs  ago)    Past Medical History (Continued)  Do you have a history of abnormalities of the uterus?: No  Did your mother take OPAL or any other hormones when she was pregnant with you?: No  Did it take you more than a year to become pregnant?: No  Have you ever been evaluated or treated for infertility?: No  Is there a history of medical problems in your family, which you feel may be important to this pregnancy?: No  Do you have any other problems we have not asked about which you feel may be important to this pregnancy?: No    Symptoms since last menstrual period  Do you have any of the following symptoms: abdominal pain, blood in stools or urine, chest pain, shortness of breath, coughing or vomiting up blood, your heart racing or skipping beats, nausea and vomiting, pain on urination or vaginal discharge or bleed: (!) Yes(nausea, breast tenderness)  Current medications, including over-the-counter medications, you are using? (If not applicable answer none): PNV  Will the patient be 35 years old or older at the time of delivery?: No    Has the patient, baby's father or anyone in either family had:  Thalassemia (Italian, Greek, Mediterranean or  background only) and an MCV result less than 80?: No  Neural tube defect such as meningomyelocele, spina bifida or anencephaly?: No  Congenital heart defect?: No  Down's Syndrome?: No  Osito-Sachs disease (Faith, Cajun, Kinyarwanda-Wallisian)?: No  Sickle cell disease or trait ()?: No  Hemophilia or other inherited problems of blood?: No  Muscular dystrophy?: No  Cystic fibrosis?: No  Hallam's chorea?: No  Mental retardation/autism?: (!) Yes(MOB's nephew and 1st cousin have autism)  If yes, was the person tested for fragile X?: No  Any other inherited genetic or chromosomal disorder?: No  Maternal metabolic disorder (e.g Insulin-dependent diabetes, PKU)?: No  A child with birth defects not listed above?: No  Recurrent pregnancy loss or stillbirth?: No   Has the patient  had any medications/street drugs/alcohol since her last menstrual period?: No  Does the patient or baby's father have any other genetic risks?: No    Infection History   Do you object to being tested for Hepatitis B?: No  Do you object to being tested for HIV?: No   Do you feel that you are at high risk for coming in contact with the AIDS virus?: No  Have you ever been treated for tuberculosis?: No  Have you ever had a positive skin test for tuberculosis?: No  Do you live with someone who has tuberculosis?: No  Have you ever been exposed to tuberculosis?: No  Do you have genital herpes?: No  Does your partner have genital herpes?: No  Have you had a viral illness since your last period?: No  Have you ever had gonorrhea, chlamydia, syphilis, venereal warts, trichomoniasis, pelvic inflammatory disease or any other sexually transmitted disease?: No  Do you know if you are a genital group B streptococcus carrier?: Unknown  Have you had chicken pox/varicella?: (!) Yes   Have you been vaccinated against chicken Pox?: No  Have you had any other infectious diseases?: No    Aleida Bennett RN

## 2020-01-22 LAB
BACTERIA SPEC CULT: NORMAL
BACTERIA SPEC CULT: NORMAL
C TRACH DNA SPEC QL NAA+PROBE: NEGATIVE
HBV SURFACE AG SERPL QL IA: NONREACTIVE
HIV 1+2 AB+HIV1 P24 AG SERPL QL IA: NONREACTIVE
N GONORRHOEA DNA SPEC QL NAA+PROBE: NEGATIVE
RUBV IGG SERPL IA-ACNC: 12 IU/ML
SPECIMEN SOURCE: NORMAL
T PALLIDUM AB SER QL: NONREACTIVE

## 2020-01-23 DIAGNOSIS — Z34.81 ENCOUNTER FOR SUPERVISION OF OTHER NORMAL PREGNANCY IN FIRST TRIMESTER: ICD-10-CM

## 2020-02-10 ENCOUNTER — PRENATAL OFFICE VISIT (OUTPATIENT)
Dept: OBGYN | Facility: CLINIC | Age: 35
End: 2020-02-10
Payer: COMMERCIAL

## 2020-02-10 VITALS — DIASTOLIC BLOOD PRESSURE: 60 MMHG | SYSTOLIC BLOOD PRESSURE: 118 MMHG | BODY MASS INDEX: 20.89 KG/M2 | WEIGHT: 127.5 LBS

## 2020-02-10 DIAGNOSIS — Z34.80 SUPERVISION OF OTHER NORMAL PREGNANCY, ANTEPARTUM: Primary | ICD-10-CM

## 2020-02-10 DIAGNOSIS — G43.009 MIGRAINE WITHOUT AURA AND WITHOUT STATUS MIGRAINOSUS, NOT INTRACTABLE: ICD-10-CM

## 2020-02-10 PROCEDURE — 99000 SPECIMEN HANDLING OFFICE-LAB: CPT | Performed by: OBSTETRICS & GYNECOLOGY

## 2020-02-10 PROCEDURE — 36415 COLL VENOUS BLD VENIPUNCTURE: CPT | Performed by: OBSTETRICS & GYNECOLOGY

## 2020-02-10 PROCEDURE — 40000791 ZZHCL STATISTIC VERIFI PRENATAL TRISOMY 21,18,13: Mod: 90 | Performed by: OBSTETRICS & GYNECOLOGY

## 2020-02-10 PROCEDURE — 99207 ZZC FIRST OB VISIT: CPT | Performed by: OBSTETRICS & GYNECOLOGY

## 2020-02-10 RX ORDER — METOCLOPRAMIDE 10 MG/1
10 TABLET ORAL 3 TIMES DAILY PRN
Qty: 30 TABLET | Refills: 1 | Status: SHIPPED | OUTPATIENT
Start: 2020-02-10 | End: 2020-02-19

## 2020-02-10 NOTE — PATIENT INSTRUCTIONS
Take 1000mg of tylenol with lots of fluids at the onset of a migraine. Go to a quiet dark place and try to sleep. If no improvement in 30 min take 10mg reglan, up to three times daily.     Try to stay hydrated with small sips throughout the headache.       Early Pregnancy Information:    Rest  Your body requires more sleep in early pregnancy. Try to get plenty of sleep at night or take a short nap during the day. Being tired can often trigger nausea. If your nausea is worse in the evening, try taking a nap before dinner.    Exercise  Energy levels are normally low in early pregnancy and exercise may be the last thing on your mind, but getting out and walking briskly for 30 minutes each day will increase metabolism, relieve stress and psychologically improve your outlook. Walking after meals can improve heartburn, constipation, and indigestion.   - You can generally continue the same exercise routine in early pregnancy that you were doing prior to pregnancy. If you were not getting daily exercise before, now is a great time to start by walking or biking for 30 minutes daily.  - Any exercise that causes cramping, bleeding, or pain needs to be stopped right away. Please report to your doctor.    [place]  It is safe to continue sexual activity in pregnancy. If you experience bleeding or pain with intercourse, please abstain until you are able to discuss this with your doctor.       Nausea & Vomiting  Women experience this problem in varying degrees during pregnancy and you may have different experiences in subsequent pregnancies. Some women experience  morning sickness,  but it is also common to experience nausea any time throughout the day.  Listen to your body and eat the kinds of foods that make you feel best.  Suggestions for Diet    The most important rule is to eat small amounts often - even if you are not hungry. Try not to go more than three hours without eating during the day or ten hours at night. An empty  stomach triggers nausea.     Eat slowly and avoid foods that are spicy or high in fat. These are difficult to digest. Do not overfill your stomach.     Drink fruit juices, water and milk between meals.     Eat a few crackers, dry toast or vanilla wafers before getting out of bed in the morning. Stay in bed 15-20 minutes after eating.    Give yourself extra time in the morning.     Do not brush your teeth until you have been up for a while.     Do not skip breakfast.     Have a snack at bedtime that includes both carbohydrates and protein, e.g., peanut butter toast or hot chocolate made with milk.    A specific food or drink may trigger nausea in one woman and alleviate it in another. Find out what works best for you and eliminate the foods that cause nausea.     Most women tolerate ice cold drinks and foods best. Sherbet and fruit juices are good examples.     Try avoid coffee and products containing caffeine; it increases stomach acid. About 1 cup of coffee daily is considered safe in pregnancy, but this may be triggering your nausea.    Avoid smoking: it also increases stomach acid.    Vitamins    Vitamins B6 and Vitamin C may help improve nausea. There have been no definite studies to prove this effective, but some women do improve.     To prevent nausea take: 25 mg of Vitamin B6 daily. You can take this up to 3 times daily. You may have to cut a tablet in half to get to 25mg     Take: 500 mg. Vitamin C daily.     Yogurt is a good source of the B Vitamins.     If taking your prenatal vitamin increases or causes nausea, stop for 7-10 days, then try again. You can also try switching to a formulation without iron in early pregnancy (a women's once daily vitamin).   Medication and other remedies    Unisom, taken as directed, may be used with Vitamin B6. Take 25mg of unisom at night, this can make you drowsy.     Beryl tablets, 250 mg, 2-4 times per day     Acupressure Wrist Bands (Sea Bands)     Peppermint or beryl  decaffeinated tea     Peppermint gum or candy  Inform your doctor if:    You cannot keep any solid food down for 24 hours.     You cannot keep liquids down.     You are losing weight.     You are running a temperature greater than 100.4  F.    Common Ailments:  Below is a list of medications that are safe to take in pregnancy. This is not everything that is safe, but merely a list of over the counter options to get you through frequently experienced symptoms.     Upper Respiratory Infections:  Sinus congestion and colds - Plain Sudafed (not extended release, avoid until at least 13 weeks gestational age)  Antihistamines -  Claritin, Benadryl, Zyrtec  Avoid nasal sprays, except for Saline only.  Sore Throat:  Lozenges - Cepacol, Chloraseptic  Cough drops - Halls, Vicks, or lemon drops    Headache, Pain, or Fever:  Tylenol or other acetaminophen products: 650-1000 mg every 6-8 hours (up to 3 gm/24 hours) as needed.   A single serving of caffeine   Increase fluid consumption.  Try a short nap. If not relieved, call the office.     Heartburn:  Sodium-free antacids - Gaviscon, Maalox, Mylanta, Tums  Acid Reflux - Prilosec, Pepcid daily  Gas: Simethicone products - Gas-X    Constipation:  Fiber supplements - Fibercon, Metamucil (powder, capsules, or wafers)  Stool softeners - Colace (Docusate Sodium),   Laxatives -Milk of Magnesia, Senna, Miralax    Diarrhea:  Imodium, Imodium AD  Avoid dairy products and stop prenatal vitamins until diarrhea subsides. If not resolved in 24-36 hours, call the office.    Insect Bites and Rashes:  Lotions - Calamine, Caladryl, Hydrocortisone 1%, DEET bug spray  Sprays - DEET bug spray  Clothing treatments - permethrin   If rash is unusual or persists, call the office.    Hemorrhoids:  Preparation H, Anusol, Tucks pads      Call the clinic [phone] right away with any of the following concerns. These phones are answered at all hours:    1.   Severe abdominal pain or cramping, that does  not go away with rest and hydration    2.   Vaginal bleeding.      Continue your prenatal vitamins daily.    Please call with any additional concerns that your have, and continue your prenatal visits every four weeks.    You can set up several future appointments ahead of time:    Schedule appointment every 4-5 weeks from 1st ob visit to 28 weeks   Schedule appointment every 2-3 weeks till 36 weeks  Schedule appointment every week from 36 weeks till delivery

## 2020-02-10 NOTE — PROGRESS NOTES
New OB Visit  Ana Jackson   February 10, 2020   12w0d     Subjective: Ana Jackson 34 year old  at 12w0d dated by LMP here today for initial OB visit. Estimated Date of Delivery: Aug 24, 2020  Patient reports No Problems. Denies cramping and vaginal spotting.   +migraines, no improvement with single tab tylenol.    Gyn History:   Menses: LMP: Patient's last menstrual period was 2019 (exact date). frequency: q month, heavy  Sexually transmitted disease history: none.    Occupation: Exec Assistant  Exercise: occasional running  H/o Chicken Pox or Varicella Vaccination: Yes    History of GDM: No   Hx PTL : No   History of HTN in pregnancy: No   Shoulder dystocia: No, but first child born with fractured ribs and clavicle   Vacuum Extraction: yes, with first  PPH: No   3rd of 4th degree laceration: No   Other complications: No    Since her last LMP she denies use of alcohol, tobacco and street drugs.    OBhx: VAVD c/b clavicle and rib fractures and fetal distress, IVD  OB History    Para Term  AB Living   4 2 2 0 1 2   SAB TAB Ectopic Multiple Live Births   0 0 0 0 2      # Outcome Date GA Lbr Ángel/2nd Weight Sex Delivery Anes PTL Lv   4 Current            3 AB 2018 7w0d          2 Term 14 40w0d  3.685 kg (8 lb 2 oz) F IVD EPI  VINNIE      Name: Ira Huston Term 12 41w0d  3.714 kg (8 lb 3 oz) F Vag-Vacuum EPI  VINNIE      Complications: Clavicle fracture, Fracture, ribs, Fetal distress during birth      Name: Franci      Obstetric Comments   This was a suspected twin pregancy       ROS: Ten point review of systems was reviewed and negative except the above.    HISTORY:  Past Medical History:   Diagnosis Date     Closed fracture of unspecified part of forearm     (L)     Herpangina 89     Migraine, unspecified, without mention of intractable migraine without mention of status migrainosus      Past Surgical History:   Procedure Laterality Date     HC TOOTH EXTRACTION  W/FORCEP       LYMPH NODE BIOPSY  2018     Family History   Problem Relation Age of Onset     Colon Cancer Mother      Asthma Mother      Cancer Maternal Grandmother         skin     Asthma Brother      Cancer - colorectal Maternal Aunt 58     Other Cancer Maternal Aunt         rectal     Anesthesia Reaction No family hx of      Blood Disease No family hx of      Neurologic Disorder No family hx of      Breast Cancer No family hx of      Heart Disease No family hx of      Social History     Socioeconomic History     Marital status:      Spouse name: Didier     Number of children: 2     Years of education: 18     Highest education level: Master's degree (e.g., MA, MS, Sonia, MEd, MSW, ILDEFONSO)   Occupational History     Occupation: exec asst   Social Needs     Financial resource strain: Not hard at all     Food insecurity:     Worry: Never true     Inability: Never true     Transportation needs:     Medical: No     Non-medical: No   Tobacco Use     Smoking status: Former Smoker     Packs/day: 0.50     Years: 2.00     Pack years: 1.00     Types: Cigarettes     Last attempt to quit: 2005     Years since quittin.2     Smokeless tobacco: Never Used     Tobacco comment: NO 2nd hand smoke at work   Substance and Sexual Activity     Alcohol use: Not Currently     Alcohol/week: 0.0 standard drinks     Comment: 1-2 per week      Drug use: No     Sexual activity: Yes     Partners: Male   Lifestyle     Physical activity:     Days per week: None     Minutes per session: None     Stress: None   Relationships     Social connections:     Talks on phone: None     Gets together: None     Attends Muslim service: None     Active member of club or organization: None     Attends meetings of clubs or organizations: None     Relationship status: None     Intimate partner violence:     Fear of current or ex partner: None     Emotionally abused: None     Physically abused: None     Forced sexual activity: None   Other Topics  Concern      Service No     Blood Transfusions No     Caffeine Concern Not Asked     Comment: 1 cup coffee daily     Occupational Exposure Not Asked     Hobby Hazards Not Asked     Sleep Concern Not Asked     Stress Concern Not Asked     Weight Concern Not Asked     Special Diet Not Asked     Back Care Not Asked     Exercise Yes     Comment: 2-3x week-jogs     Bike Helmet Not Asked     Seat Belt Yes     Self-Exams Not Asked     Parent/sibling w/ CABG, MI or angioplasty before 65F 55M? Not Asked   Social History Narrative     None     Current Outpatient Medications   Medication Sig     Prenatal Vit-Fe Fumarate-FA (PRENATAL MULTIVITAMIN W/IRON) 27-0.8 MG tablet Take 1 tablet by mouth daily     No current facility-administered medications for this visit.      No Known Allergies    Past medical, surgical, social and family history were reviewed and updated in [facility].      EXAM:  /60 (BP Location: Right arm, Patient Position: Chair, Cuff Size: Adult Regular)   Wt 57.8 kg (127 lb 8 oz)   LMP 2019 (Exact Date)   BMI 20.89 kg/m       Gen:  no acute distress, comfortable  HENT: No scleral injection or icterus  CV: Regular rate and rhythm, no murmur  Resp: CTAB, Normal work of breathing, no cough  GI: Abdomen soft, non-tender. No masses, organomegaly  Skin: No suspicious lesions or rashes  Psychiatric: mentation appears normal and affect bright   + with BSUS      Recent Labs   Lab Test 20  0911   ABO O   RH Pos   AS Neg     Rhogam not indicated     Recent Labs   Lab Test 20  0912   HEPBANG Nonreactive   HIAGAB Nonreactive   RUQIGG 12       Treponemal antibody neg    CBC RESULTS:   Recent Labs   Lab Test 20  0912   WBC 8.1   RBC 4.00   HGB 10.6*   HCT 33.1*   MCV 83   MCH 26.5   MCHC 32.0   RDW 16.2*          ASSESSMENT:  34 year old  at 12w0d dated by LMP c/w early US here for NOB visit.    PLAN:    1)Prenatal labs reviewed. She has no questions.  2) EDUCATION :  RECOMMENDED WEIGHT GAIN: 25-35 lbs given Body mass index is 20.89 kg/m ..   - Instructed on best evidence for: healthy diet and foods to avoid; exercise and activity during pregnancy; and maintenance of a generally healthy lifestyle. Reviewed early pregnancy education, provider coverage, labor and delivery, and prenatal visits.  Discussed the harms, benefits, side effects and alternative therapies for current prescribed and OTC medications.  - recommend PNV  3) Discussed options for screening for chromosomal anomalies, including first screen, noninvasive prenatal testing, CVS/amniocentesis, quad screen, and ultrasound at 18-20 weeks. She is electing noninvasive prenatal testing and ultrasound at 18-20 weeks.  4) Mild anemia: PNV w/iron, repeat CBC next visit.     Follow up in 4-5 weeks. She is encouraged to call sooner with questions or concerns.    Emma Price MD   Obstetrics and Gynecology

## 2020-02-17 ENCOUNTER — TELEPHONE (OUTPATIENT)
Dept: OBGYN | Facility: CLINIC | Age: 35
End: 2020-02-17

## 2020-02-17 NOTE — TELEPHONE ENCOUNTER
Results received from [facility] testing in Mike triage..  Testing done:  Innatal Prenatal Screen    Action:  LM for pt to cb to report NORMAL results.    Gender:  Will ask pt if they wish to know the gender. BOY    Please route to provider as FYI once pt is informed. AB Aleida Bennett RN

## 2020-02-19 ENCOUNTER — HOSPITAL ENCOUNTER (EMERGENCY)
Facility: CLINIC | Age: 35
Discharge: HOME | End: 2020-02-19
Attending: PHYSICIAN ASSISTANT | Admitting: PHYSICIAN ASSISTANT
Payer: COMMERCIAL

## 2020-02-19 VITALS
RESPIRATION RATE: 16 BRPM | SYSTOLIC BLOOD PRESSURE: 105 MMHG | HEART RATE: 94 BPM | OXYGEN SATURATION: 100 % | TEMPERATURE: 98 F | DIASTOLIC BLOOD PRESSURE: 68 MMHG

## 2020-02-19 DIAGNOSIS — R51.9 HEADACHE: ICD-10-CM

## 2020-02-19 PROCEDURE — 25000128 H RX IP 250 OP 636: Performed by: PHYSICIAN ASSISTANT

## 2020-02-19 PROCEDURE — 25800030 ZZH RX IP 258 OP 636: Performed by: PHYSICIAN ASSISTANT

## 2020-02-19 PROCEDURE — 96361 HYDRATE IV INFUSION ADD-ON: CPT

## 2020-02-19 PROCEDURE — 96374 THER/PROPH/DIAG INJ IV PUSH: CPT

## 2020-02-19 PROCEDURE — 96375 TX/PRO/DX INJ NEW DRUG ADDON: CPT

## 2020-02-19 PROCEDURE — 99284 EMERGENCY DEPT VISIT MOD MDM: CPT | Mod: 25

## 2020-02-19 RX ORDER — METOCLOPRAMIDE 10 MG/1
10 TABLET ORAL 3 TIMES DAILY PRN
Qty: 10 TABLET | Refills: 0 | Status: SHIPPED | OUTPATIENT
Start: 2020-02-19 | End: 2021-05-24

## 2020-02-19 RX ORDER — METOCLOPRAMIDE HYDROCHLORIDE 5 MG/ML
10 INJECTION INTRAMUSCULAR; INTRAVENOUS ONCE
Status: COMPLETED | OUTPATIENT
Start: 2020-02-19 | End: 2020-02-19

## 2020-02-19 RX ORDER — DIPHENHYDRAMINE HYDROCHLORIDE 50 MG/ML
25 INJECTION INTRAMUSCULAR; INTRAVENOUS ONCE
Status: COMPLETED | OUTPATIENT
Start: 2020-02-19 | End: 2020-02-19

## 2020-02-19 RX ADMIN — SODIUM CHLORIDE 1000 ML: 9 INJECTION, SOLUTION INTRAVENOUS at 20:29

## 2020-02-19 RX ADMIN — METOCLOPRAMIDE 10 MG: 5 INJECTION, SOLUTION INTRAMUSCULAR; INTRAVENOUS at 20:29

## 2020-02-19 RX ADMIN — DIPHENHYDRAMINE HYDROCHLORIDE 25 MG: 50 INJECTION, SOLUTION INTRAMUSCULAR; INTRAVENOUS at 20:29

## 2020-02-19 ASSESSMENT — ENCOUNTER SYMPTOMS
WEAKNESS: 0
VOMITING: 1
NUMBNESS: 0
PHOTOPHOBIA: 1
HEADACHES: 1

## 2020-02-19 NOTE — ED AVS SNAPSHOT
[facility] Emergency Department  [address]  [facility] [address]  Phone:  [phone]  Fax:  [phone]                                    Ana Jackson   MRN: [id number]    Department:  [facility] Emergency Department   Date of Visit:  2/19/2020           After Visit Summary Signature Page    I have received my discharge instructions, and my questions have been answered. I have discussed any challenges I see with this plan with the nurse or doctor.    ..........................................................................................................................................  Patient/Patient Representative Signature      ..........................................................................................................................................  Patient Representative Print Name and Relationship to Patient    ..................................................               ................................................  Date                                   Time    ..........................................................................................................................................  Reviewed by Signature/Title    ...................................................              ..............................................  Date                                               Time          22EPIC Rev 08/18

## 2020-02-20 NOTE — ED TRIAGE NOTES
Pt in with C/O migraine headache. Pt reports onset of headache around noon today. Pt is 14 weeks pregnant, reports hx of migraines in the past. Last dose of tylenol 1400. Pt photosensitive, nauseas and has had episodes of vomiting. Pt A&Ox4 ABCD's intact

## 2020-02-20 NOTE — ED PROVIDER NOTES
History     Chief Complaint:  Headache      HPI   Ana Jackson is a 34 year old female, 14 weeks pregnant, with a history of migraines who presents with a headache. The patient says that the right sided headache started at noon today and says that it feels like her past migraines. She endorses light sensitivity and vomiting, noting that for the past 2 hours the vomiting has been nonstop. She denies any head trauma, weakness, tingling, or numbness. She has been taking Tylenol without relief.     Allergies:  No Known Drug Allergies     Medications:    Reglan      Past Medical History:    Migraine  Herpangina    Past Surgical History:    Tooth extraction  Lymph node biopsy     Family History:    Colon cancer  Asthma  Skin cancer  Asthma   Rectal cancer     Social History:  The patient was accompanied to the ED by .  Smoking Status: Former Smoker  Smokeless Tobacco: Never Used  Alcohol Use: Negative   Drug Use: Negative  PCP: Chloé Hussein   Marital Status:        Review of Systems   Eyes: Positive for photophobia.   Gastrointestinal: Positive for vomiting.   Neurological: Positive for headaches. Negative for weakness and numbness.   All other systems reviewed and are negative.    Physical Exam     Patient Vitals for the past 24 hrs:   BP Temp Temp src Pulse Resp SpO2   02/19/20 2016 113/72 98  F (36.7  C) Temporal 94 16 100 %        Physical Exam  General: Resting comfortably.  Alert and oriented.   Head:  The scalp, face, and head appear normal   Eyes:  The pupils are equal, round, and reactive to light     Extraocular muscles are intact    Conjunctivae and sclerae are normal    ENT:    The oropharynx is normal    Uvula is in the midline    Neck:  No lymphadenopathy  CV:  Regular rate and rhythm     Normal S1/S2  Resp:  Lungs are clear to auscultation    Non-labored    No rales or wheezing   MS:  Normal muscular tone   Skin:  No rash or acute skin lesions noted   Neuro: Speech is normal and  fluent. Cranial nerves 2-12 grossly intact.  strength is equal. Strength and sensation intact in all 4 extremities. Finger-nose finger and heel shin intact. No focal deficits.      Emergency Department Course     Interventions:  2029 NS 1 L IV   Reglan 10 mg IV   Benadryl 25 mg IV     Emergency Department Course:    2018 Nursing notes and vitals reviewed.    2030 I performed an exam of the patient as documented above.     2121 Patient rechecked and updated.  Patient report feeling better.     2202 Patient rechecked and updated.       The patient is discharged to home.     Impression & Plan      Medical Decision Making:  Ana Jackson is a 34 year old female who presents to the emergency department today for evaluation of a headache. She has a history of migraines. She is currently 14 weeks pregnant. Evaluation in the emergency department has been negative. The patient has not had any fever, weakness, numbness, paresthesia, neck stiffness or confusion. Meningitis, subarachnoid hemorrhage, CNS tumor, and stroke are considered as part of the differential, and considered unlikely. The patient's symptoms greatly improved with medication interventions as seen above. She was able to tolerate PO without difficulty. The patient should follow-up with her primary physician in 2-3 days for recheck. She was given a prescription for Reglan.  She was instructed to take Tylenol for headache. She will for increasing pain, fever, vomiting, neck stiffness, syncope, or weakness. All questions were answered prior to discharge. The patient understands and agrees to this plan.      Diagnosis:    ICD-10-CM    1. Headache R51      Disposition:   Findings and plan explained to the Patient. Patient discharged home with instructions regarding supportive care, medications, and reasons to return. The importance of close follow-up was reviewed.     Discharge Medications:  New Prescriptions    METOCLOPRAMIDE (REGLAN) 10 MG PO TABLET     Take 1 tablet (10 mg) by mouth 3 times daily as needed (Nausea or Vomiting)       Scribe Disclosure:  I, Ulises Cohen, am serving as a scribe at 8:20 PM on 2/19/2020 to document services personally performed by Marta Andrade PA based on my observations and the provider's statements to me.    [facility] EMERGENCY DEPARTMENT       Marta Andrade PA-C  02/19/20 [id number]

## 2020-03-11 ENCOUNTER — PRENATAL OFFICE VISIT (OUTPATIENT)
Dept: OBGYN | Facility: CLINIC | Age: 35
End: 2020-03-11
Payer: COMMERCIAL

## 2020-03-11 VITALS — DIASTOLIC BLOOD PRESSURE: 70 MMHG | BODY MASS INDEX: 21.47 KG/M2 | SYSTOLIC BLOOD PRESSURE: 106 MMHG | WEIGHT: 131 LBS

## 2020-03-11 DIAGNOSIS — Z34.80 SUPERVISION OF OTHER NORMAL PREGNANCY, ANTEPARTUM: Primary | ICD-10-CM

## 2020-03-11 LAB — HGB BLD-MCNC: 11 G/DL (ref 11.7–15.7)

## 2020-03-11 PROCEDURE — 36415 COLL VENOUS BLD VENIPUNCTURE: CPT | Performed by: OBSTETRICS & GYNECOLOGY

## 2020-03-11 PROCEDURE — 85018 HEMOGLOBIN: CPT | Performed by: OBSTETRICS & GYNECOLOGY

## 2020-03-11 PROCEDURE — 99207 ZZC PRENATAL VISIT: CPT | Performed by: OBSTETRICS & GYNECOLOGY

## 2020-03-11 NOTE — NURSING NOTE
"Chief Complaint   Patient presents with     Prenatal Care     16 2/7 weeks       Initial /70   Wt 59.4 kg (131 lb)   LMP 2019 (Exact Date)   BMI 21.47 kg/m   Estimated body mass index is 21.47 kg/m  as calculated from the following:    Height as of 20: 1.664 m (5' 5.5\").    Weight as of this encounter: 59.4 kg (131 lb).  BP completed using cuff size: regular    Questioned patient about current smoking habits.  Pt. has never smoked.          The following HM Due: NONE    +flutters    Thu Jones, CMA      "

## 2020-03-11 NOTE — PROGRESS NOTES
IUP at 16w2d,    Migraines: was seen in ER for 30 hour migraine. Improved with IV reglan. Has reglan at home but tries not to take it. Encouraged to treat at onset of HA with 1g tylenol and 10mg reglan, in addition to rest in a quiet dark place.  Mild anemia: on PNV w/iron. Repeat hgb today. Add iron supplement if indicated.   Anatomy scan 18-20w scheduled.   Return to clinic 4-5w.   Emma Price MD

## 2020-04-07 ENCOUNTER — PRENATAL OFFICE VISIT (OUTPATIENT)
Dept: OBGYN | Facility: CLINIC | Age: 35
End: 2020-04-07
Payer: COMMERCIAL

## 2020-04-07 VITALS — WEIGHT: 137.1 LBS | BODY MASS INDEX: 22.47 KG/M2 | SYSTOLIC BLOOD PRESSURE: 94 MMHG | DIASTOLIC BLOOD PRESSURE: 58 MMHG

## 2020-04-07 DIAGNOSIS — Z34.80 SUPERVISION OF OTHER NORMAL PREGNANCY, ANTEPARTUM: Primary | ICD-10-CM

## 2020-04-07 DIAGNOSIS — Z34.80 SUPERVISION OF OTHER NORMAL PREGNANCY, ANTEPARTUM: ICD-10-CM

## 2020-04-07 PROCEDURE — 99207 ZZC PRENATAL VISIT: CPT | Performed by: OBSTETRICS & GYNECOLOGY

## 2020-04-07 NOTE — PROGRESS NOTES
33yo  at 20w1d.  No problems or concerns.  Has U/S just prior to visit.  Boy! (has 2 girls).  Final report pending.    RTC 4 weeks (phone visit)

## 2020-04-07 NOTE — NURSING NOTE
"Chief Complaint   Patient presents with     Prenatal Care   20w1d  No concerns    initial Wt 62.2 kg (137 lb 1.6 oz)   LMP 11/18/2019 (Exact Date)   BMI 22.47 kg/m   Estimated body mass index is 22.47 kg/m  as calculated from the following:    Height as of 1/21/20: 1.664 m (5' 5.5\").    Weight as of this encounter: 62.2 kg (137 lb 1.6 oz).  BP completed using cuff size regular.  Kiya Grace CMA    "

## 2020-05-06 ENCOUNTER — VIRTUAL VISIT (OUTPATIENT)
Dept: OBGYN | Facility: CLINIC | Age: 35
End: 2020-05-06
Payer: COMMERCIAL

## 2020-05-06 VITALS — WEIGHT: 140 LBS | BODY MASS INDEX: 22.94 KG/M2

## 2020-05-06 DIAGNOSIS — Z34.80 SUPERVISION OF OTHER NORMAL PREGNANCY, ANTEPARTUM: Primary | ICD-10-CM

## 2020-05-06 PROCEDURE — 99207 ZZC PRENATAL VISIT: CPT | Performed by: OBSTETRICS & GYNECOLOGY

## 2020-05-06 NOTE — PROGRESS NOTES
"Ana Jackson is a 34 year old female who is being evaluated via a billable telephone visit.      The patient has been notified of following:     \"This telephone visit will be conducted via a call between you and your physician/provider. We have found that certain health care needs can be provided without the need for a physical exam.  This service lets us provide the care you need with a short phone conversation.  If a prescription is necessary we can send it directly to your pharmacy.  If lab work is needed we can place an order for that and you can then stop by our lab to have the test done at a later time.    Telephone visits are billed at different rates depending on your insurance coverage. During this emergency period, for some insurers they may be billed the same as an in-person visit.  Please reach out to your insurance provider with any questions.    If during the course of the call the physician/provider feels a telephone visit is not appropriate, you will not be charged for this service.\"    Patient has given verbal consent for Telephone visit?  Yes    What phone number would you like to be contacted at? [phone]    +fetal movement  -swelling  -headaches  Thu Jones, ANGELINA      IUP at 24w2d,    Feeling good overall. Reviewed normal US.   Migraines: has used reglan a couple of times with good success.  Denies contractions, good FM.  COVID:  - reviewed plan for office vs phone visits  - reviewed IOL at 39w if desired  - reviewed 1 visitor for delivery, 24 hour discharge, phone visit for postpartum        Dr. Emma Prcie MD   Obstetrics and Gynecology  [facility]    "

## 2020-06-04 ENCOUNTER — PRENATAL OFFICE VISIT (OUTPATIENT)
Dept: OBGYN | Facility: CLINIC | Age: 35
End: 2020-06-04
Payer: COMMERCIAL

## 2020-06-04 VITALS
BODY MASS INDEX: 24.43 KG/M2 | SYSTOLIC BLOOD PRESSURE: 110 MMHG | WEIGHT: 149.1 LBS | DIASTOLIC BLOOD PRESSURE: 62 MMHG | HEART RATE: 108 BPM

## 2020-06-04 DIAGNOSIS — Z23 ENCOUNTER FOR IMMUNIZATION: ICD-10-CM

## 2020-06-04 DIAGNOSIS — Z34.80 SUPERVISION OF OTHER NORMAL PREGNANCY, ANTEPARTUM: Primary | ICD-10-CM

## 2020-06-04 LAB
ERYTHROCYTE [DISTWIDTH] IN BLOOD BY AUTOMATED COUNT: 12.6 % (ref 10–15)
GLUCOSE 1H P 50 G GLC PO SERPL-MCNC: 93 MG/DL (ref 60–129)
HCT VFR BLD AUTO: 32.4 % (ref 35–47)
HGB BLD-MCNC: 10.5 G/DL (ref 11.7–15.7)
MCH RBC QN AUTO: 29.7 PG (ref 26.5–33)
MCHC RBC AUTO-ENTMCNC: 32.4 G/DL (ref 31.5–36.5)
MCV RBC AUTO: 92 FL (ref 78–100)
PLATELET # BLD AUTO: 294 10E9/L (ref 150–450)
RBC # BLD AUTO: 3.54 10E12/L (ref 3.8–5.2)
WBC # BLD AUTO: 9.1 10E9/L (ref 4–11)

## 2020-06-04 PROCEDURE — 90715 TDAP VACCINE 7 YRS/> IM: CPT | Performed by: OBSTETRICS & GYNECOLOGY

## 2020-06-04 PROCEDURE — 90471 IMMUNIZATION ADMIN: CPT | Performed by: OBSTETRICS & GYNECOLOGY

## 2020-06-04 PROCEDURE — 86780 TREPONEMA PALLIDUM: CPT | Performed by: OBSTETRICS & GYNECOLOGY

## 2020-06-04 PROCEDURE — 85027 COMPLETE CBC AUTOMATED: CPT | Performed by: OBSTETRICS & GYNECOLOGY

## 2020-06-04 PROCEDURE — 99207 ZZC PRENATAL VISIT: CPT | Performed by: OBSTETRICS & GYNECOLOGY

## 2020-06-04 PROCEDURE — 82950 GLUCOSE TEST: CPT | Performed by: OBSTETRICS & GYNECOLOGY

## 2020-06-04 PROCEDURE — 36415 COLL VENOUS BLD VENIPUNCTURE: CPT | Performed by: OBSTETRICS & GYNECOLOGY

## 2020-06-04 NOTE — NURSING NOTE
"Chief Complaint   Patient presents with     Prenatal Care     28 weeks 3 days 1 hour GCT , 28 week labs and Tdap        Initial /62 (BP Location: Right arm, Patient Position: Chair, Cuff Size: Adult Regular)   Pulse 108   Wt 67.6 kg (149 lb 1.6 oz)   LMP 2019 (Exact Date)   Breastfeeding No   BMI 24.43 kg/m   Estimated body mass index is 24.43 kg/m  as calculated from the following:    Height as of 20: 1.664 m (5' 5.5\").    Weight as of this encounter: 67.6 kg (149 lb 1.6 oz).  BP completed using cuff size: regular    Questioned patient about current smoking habits.  Pt. has never smoked.    28w3d      The following HM Due: NONE      +FM daily   +Lower Left Quad sharp pains periodically         Clinic Administered Medication Documentation      Injectable Medication Documentation    Patient was given Tdap Adacel . Prior to medication administration, verified patients identity using patient s name and date of birth. Please see MAR and medication order for additional information. Patient instructed to remain in clinic for 15 minutes.      Was entire vial of medication used? Yes  Vial/Syringe: Single dose vial  Expiration Date:  3/21/2022  Was this medication supplied by the patient? No         Mell Stuart CMA on 2020 at 8:05 AM             "

## 2020-06-05 LAB — T PALLIDUM AB SER QL: NONREACTIVE

## 2020-06-08 ENCOUNTER — TELEPHONE (OUTPATIENT)
Dept: OBGYN | Facility: CLINIC | Age: 35
End: 2020-06-08

## 2020-06-08 NOTE — TELEPHONE ENCOUNTER
"29w0d    Pt fainted today at her home.  She was outside and felt nauseous.  She ended up fainting when she get inside.     She states that she only was \"out\" for a second.  Landed on the side of her body, did not hit her belly.     No soreness.  No bleeding or fluid leakage.     Advised to drink fluids, eat a snack and rest on left side.  Monitor for pain and movements.       Kate AUGUSTINE R.N.          "

## 2020-06-08 NOTE — TELEPHONE ENCOUNTER
I would recommend her still coming in just to be sure that there is not placental abruption.   Can she be called to come in to triage for that evaluation.   Thank you.     Renea Noland MD

## 2020-06-16 ENCOUNTER — VIRTUAL VISIT (OUTPATIENT)
Dept: OBGYN | Facility: CLINIC | Age: 35
End: 2020-06-16
Payer: COMMERCIAL

## 2020-06-16 DIAGNOSIS — Z34.80 SUPERVISION OF OTHER NORMAL PREGNANCY, ANTEPARTUM: Primary | ICD-10-CM

## 2020-06-16 PROCEDURE — 99207 ZZC PRENATAL VISIT: CPT | Mod: TEL | Performed by: OBSTETRICS & GYNECOLOGY

## 2020-06-16 NOTE — PROGRESS NOTES
"Ana Jackson is a 35 year old female who is being evaluated via a billable telephone visit.      The patient has been notified of following:     \"This telephone visit will be conducted via a call between you and your physician/provider. We have found that certain health care needs can be provided without the need for a physical exam.  This service lets us provide the care you need with a short phone conversation.  If a prescription is necessary we can send it directly to your pharmacy.  If lab work is needed we can place an order for that and you can then stop by our lab to have the test done at a later time.    Telephone visits are billed at different rates depending on your insurance coverage. During this emergency period, for some insurers they may be billed the same as an in-person visit.  Please reach out to your insurance provider with any questions.    If during the course of the call the physician/provider feels a telephone visit is not appropriate, you will not be charged for this service.\"    Patient has given verbal consent for Telephone visit?  Yes    What phone number would you like to be contacted at? [phone]    How would you like to obtain your AVS? MyChart    +fetal movement  -headaches  -swelling  Next visit   Thu Jones, ANGELINA    IUP at 30w1d,    Feeling good overall. Had an episode of syncope  and was evaluated in triage. No vasomotor symptoms since then.  Migraines: has used reglan and tylenol a couple of times with good success.  Denies contractions, good FM.  Mild anemia, hgb 10.5, on daily iron. Will increase to twice daily.   Return to clinic in 2w.     Emma Price MD     "

## 2020-07-14 ENCOUNTER — PRENATAL OFFICE VISIT (OUTPATIENT)
Dept: OBGYN | Facility: CLINIC | Age: 35
End: 2020-07-14
Payer: COMMERCIAL

## 2020-07-14 VITALS — WEIGHT: 157.5 LBS | SYSTOLIC BLOOD PRESSURE: 102 MMHG | DIASTOLIC BLOOD PRESSURE: 64 MMHG | BODY MASS INDEX: 25.81 KG/M2

## 2020-07-14 DIAGNOSIS — Z34.80 SUPERVISION OF OTHER NORMAL PREGNANCY, ANTEPARTUM: Primary | ICD-10-CM

## 2020-07-14 PROCEDURE — 99207 ZZC PRENATAL VISIT: CPT | Performed by: OBSTETRICS & GYNECOLOGY

## 2020-07-14 NOTE — PROGRESS NOTES
36yo  at 34w1d here for routine visit.  No concerns.  No ctx or cramps.  Baby active.  RTC 2 weeks

## 2020-07-14 NOTE — NURSING NOTE
"Chief Complaint   Patient presents with     Prenatal Care   34w1d  No concerns  Baby active    initial /64   Wt 71.4 kg (157 lb 8 oz)   LMP 11/18/2019 (Exact Date)   BMI 25.81 kg/m   Estimated body mass index is 25.81 kg/m  as calculated from the following:    Height as of 1/21/20: 1.664 m (5' 5.5\").    Weight as of this encounter: 71.4 kg (157 lb 8 oz).  BP completed using cuff size regular.  Kiya Grace CMA    "

## 2020-07-29 ENCOUNTER — PRENATAL OFFICE VISIT (OUTPATIENT)
Dept: OBGYN | Facility: CLINIC | Age: 35
End: 2020-07-29
Payer: COMMERCIAL

## 2020-07-29 VITALS — WEIGHT: 159.6 LBS | DIASTOLIC BLOOD PRESSURE: 66 MMHG | BODY MASS INDEX: 26.15 KG/M2 | SYSTOLIC BLOOD PRESSURE: 104 MMHG

## 2020-07-29 DIAGNOSIS — Z34.80 SUPERVISION OF OTHER NORMAL PREGNANCY, ANTEPARTUM: Primary | ICD-10-CM

## 2020-07-29 PROCEDURE — 99207 ZZC PRENATAL VISIT: CPT | Performed by: OBSTETRICS & GYNECOLOGY

## 2020-07-29 PROCEDURE — 87653 STREP B DNA AMP PROBE: CPT | Performed by: OBSTETRICS & GYNECOLOGY

## 2020-07-29 NOTE — PROGRESS NOTES
36yo  at 36w2d here for routine visit.  No problems or concerns.  L&D discussed.  GBS obtained.  RTC 1 week

## 2020-07-29 NOTE — NURSING NOTE
"Chief Complaint   Patient presents with     Prenatal Care   36w2d  GBS  initial /66   Wt 72.4 kg (159 lb 9.6 oz)   LMP 11/18/2019 (Exact Date)   BMI 26.15 kg/m   Estimated body mass index is 26.15 kg/m  as calculated from the following:    Height as of 1/21/20: 1.664 m (5' 5.5\").    Weight as of this encounter: 72.4 kg (159 lb 9.6 oz).  BP completed using cuff size regular.  Kiya Grace CMA    "

## 2020-07-30 LAB
GP B STREP DNA SPEC QL NAA+PROBE: NEGATIVE
SPECIMEN SOURCE: NORMAL

## 2020-08-03 ENCOUNTER — PRENATAL OFFICE VISIT (OUTPATIENT)
Dept: OBGYN | Facility: CLINIC | Age: 35
End: 2020-08-03
Payer: COMMERCIAL

## 2020-08-03 VITALS
SYSTOLIC BLOOD PRESSURE: 110 MMHG | WEIGHT: 161 LBS | BODY MASS INDEX: 26.38 KG/M2 | HEART RATE: 92 BPM | DIASTOLIC BLOOD PRESSURE: 76 MMHG

## 2020-08-03 DIAGNOSIS — Z34.83 ENCOUNTER FOR SUPERVISION OF OTHER NORMAL PREGNANCY IN THIRD TRIMESTER: Primary | ICD-10-CM

## 2020-08-03 DIAGNOSIS — Z36.89 DETERMINE FETAL PRESENTATION USING ULTRASOUND: ICD-10-CM

## 2020-08-03 PROCEDURE — 99207 ZZC COMPLICATED OB VISIT: CPT | Performed by: OBSTETRICS & GYNECOLOGY

## 2020-08-03 NOTE — NURSING NOTE
"Chief Complaint   Patient presents with     Prenatal Care     37 weeks- no concerns        Initial /76 (BP Location: Right arm, Patient Position: Sitting, Cuff Size: Adult Regular)   Pulse 92   Wt 73 kg (161 lb)   LMP 2019 (Exact Date)   BMI 26.38 kg/m   Estimated body mass index is 26.38 kg/m  as calculated from the following:    Height as of 20: 1.664 m (5' 5.5\").    Weight as of this encounter: 73 kg (161 lb).  BP completed using cuff size: large    Questioned patient about current smoking habits.  Pt. has never smoked.          The following HM Due: NONE    37w0d  Richard Vance CMA                "

## 2020-08-03 NOTE — PROGRESS NOTES
No c/o's.  Advised to take Fe OTC supp. Cx-0/60/-5.  Limited OB ultrasound performed by me confirms Cephalic presentation.  Fetal movement counts BID, rupture of membranes/labor precautions reviewed.  RTC 1 week(s).    Encounter Diagnoses   Name Primary?     Encounter for supervision of other normal pregnancy in third trimester Yes     Determine fetal presentation using ultrasound        Risk factors listed above are stable and being addressed as noted.    Matthew Cooper MD  [facility]

## 2020-08-10 ENCOUNTER — PRENATAL OFFICE VISIT (OUTPATIENT)
Dept: OBGYN | Facility: CLINIC | Age: 35
End: 2020-08-10
Payer: COMMERCIAL

## 2020-08-10 VITALS — DIASTOLIC BLOOD PRESSURE: 62 MMHG | WEIGHT: 162 LBS | SYSTOLIC BLOOD PRESSURE: 110 MMHG | BODY MASS INDEX: 26.55 KG/M2

## 2020-08-10 DIAGNOSIS — Z34.83 ENCOUNTER FOR SUPERVISION OF OTHER NORMAL PREGNANCY IN THIRD TRIMESTER: Primary | ICD-10-CM

## 2020-08-10 PROCEDURE — 99207 ZZC PRENATAL VISIT: CPT | Performed by: OBSTETRICS & GYNECOLOGY

## 2020-08-10 NOTE — NURSING NOTE
"Chief Complaint   Patient presents with     Prenatal Care     38 weeks- no concerns        Initial /62 (BP Location: Right arm, Patient Position: Sitting, Cuff Size: Adult Regular)   Wt 73.5 kg (162 lb)   LMP 2019 (Exact Date)   BMI 26.55 kg/m   Estimated body mass index is 26.55 kg/m  as calculated from the following:    Height as of 20: 1.664 m (5' 5.5\").    Weight as of this encounter: 73.5 kg (162 lb).  BP completed using cuff size: regular    Questioned patient about current smoking habits.  Pt. has never smoked.          The following HM Due: NONE    38w0d  Richard Vance CMA                "

## 2020-08-17 ENCOUNTER — PRENATAL OFFICE VISIT (OUTPATIENT)
Dept: OBGYN | Facility: CLINIC | Age: 35
End: 2020-08-17
Payer: COMMERCIAL

## 2020-08-17 VITALS — DIASTOLIC BLOOD PRESSURE: 60 MMHG | SYSTOLIC BLOOD PRESSURE: 118 MMHG | WEIGHT: 162.7 LBS | BODY MASS INDEX: 26.66 KG/M2

## 2020-08-17 DIAGNOSIS — Z34.83 ENCOUNTER FOR SUPERVISION OF OTHER NORMAL PREGNANCY IN THIRD TRIMESTER: Primary | ICD-10-CM

## 2020-08-17 PROCEDURE — 99207 ZZC PRENATAL VISIT: CPT | Performed by: OBSTETRICS & GYNECOLOGY

## 2020-08-17 RX ORDER — FERROUS SULFATE 324(65)MG
324 TABLET, DELAYED RELEASE (ENTERIC COATED) ORAL DAILY
COMMUNITY
End: 2021-05-24

## 2020-08-17 NOTE — PROGRESS NOTES
No c/o's.  Cx-2+/70/-2/Vtx.  Discussed possible elective induction but Pt is inclined to wait 1 more week to see if spontaneous labor will occur.  Fetal movement counts BID, rupture of membranes/labor precautions reviewed.  RTC 1 week(s).    Encounter Diagnosis   Name Primary?     Encounter for supervision of other normal pregnancy in third trimester Yes       Risk factors listed above are stable and being addressed as noted.    Matthew Cooper MD  [facility]

## 2020-08-17 NOTE — NURSING NOTE
"Chief Complaint   Patient presents with     Prenatal Care     39 weeks 0 days, no c/o vaginal bleeding, leaking of fluids. Pt reports some mild contractions, and increased mucous discharge. Feeling fetal movement.        Initial /60   Wt 73.8 kg (162 lb 11.2 oz)   LMP 2019 (Exact Date)   BMI 26.66 kg/m   Estimated body mass index is 26.66 kg/m  as calculated from the following:    Height as of 20: 1.664 m (5' 5.5\").    Weight as of this encounter: 73.8 kg (162 lb 11.2 oz).  BP completed using cuff size: regular    Questioned patient about current smoking habits.  Pt. has never smoked.          The following HM Due: NONE    Julienne Harrington CMA             "

## 2020-08-24 ENCOUNTER — PRENATAL OFFICE VISIT (OUTPATIENT)
Dept: OBGYN | Facility: CLINIC | Age: 35
End: 2020-08-24
Payer: COMMERCIAL

## 2020-08-24 ENCOUNTER — TELEPHONE (OUTPATIENT)
Dept: OBGYN | Facility: CLINIC | Age: 35
End: 2020-08-24

## 2020-08-24 VITALS — BODY MASS INDEX: 26.68 KG/M2 | DIASTOLIC BLOOD PRESSURE: 68 MMHG | SYSTOLIC BLOOD PRESSURE: 126 MMHG | WEIGHT: 162.8 LBS

## 2020-08-24 DIAGNOSIS — Z34.83 ENCOUNTER FOR SUPERVISION OF OTHER NORMAL PREGNANCY IN THIRD TRIMESTER: Primary | ICD-10-CM

## 2020-08-24 PROCEDURE — 99207 ZZC PRENATAL VISIT: CPT | Performed by: OBSTETRICS & GYNECOLOGY

## 2020-08-24 NOTE — TELEPHONE ENCOUNTER
Induction smart phrase   Procedure: Induction  Date: 8/28/2020  Time: 7:30am  Hospital: [facility]  Orders faxed and the patient was advised to call [facility] [phone] labor and delivery department one hour prior to arrival.    Added to outlook  Kiya Grace CMA

## 2020-08-24 NOTE — PROGRESS NOTES
No c/o's.  Cx-2+/70/-2/Vtx.  Her  is leaving on business in 7 days.  Therefore requesting elective induction as soon as possible.  Will schedule induction for 8/28/2020.  COVID test ordered.  Fetal movement counts BID, rupture of membranes/labor precautions reviewed.    Encounter Diagnosis   Name Primary?     Encounter for supervision of other normal pregnancy in third trimester Yes       Risk factors listed above are stable and being addressed as noted.    Matthew Cooper MD  [facility]

## 2020-08-24 NOTE — NURSING NOTE
"Chief Complaint   Patient presents with     Prenatal Care   40w0d  C/o contractions today    initial /68   Wt 73.8 kg (162 lb 12.8 oz)   LMP 11/18/2019 (Exact Date)   BMI 26.68 kg/m   Estimated body mass index is 26.68 kg/m  as calculated from the following:    Height as of 1/21/20: 1.664 m (5' 5.5\").    Weight as of this encounter: 73.8 kg (162 lb 12.8 oz).  BP completed using cuff size regular.  Kiya Grace CMA    "

## 2020-08-25 DIAGNOSIS — Z34.83 ENCOUNTER FOR SUPERVISION OF OTHER NORMAL PREGNANCY IN THIRD TRIMESTER: ICD-10-CM

## 2020-08-25 PROCEDURE — U0003 INFECTIOUS AGENT DETECTION BY NUCLEIC ACID (DNA OR RNA); SEVERE ACUTE RESPIRATORY SYNDROME CORONAVIRUS 2 (SARS-COV-2) (CORONAVIRUS DISEASE [COVID-19]), AMPLIFIED PROBE TECHNIQUE, MAKING USE OF HIGH THROUGHPUT TECHNOLOGIES AS DESCRIBED BY CMS-2020-01-R: HCPCS | Performed by: OBSTETRICS & GYNECOLOGY

## 2020-08-26 ENCOUNTER — TELEPHONE (OUTPATIENT)
Dept: OBGYN | Facility: CLINIC | Age: 35
End: 2020-08-26

## 2020-08-26 ENCOUNTER — HOSPITAL ENCOUNTER (INPATIENT)
Facility: CLINIC | Age: 35
LOS: 2 days | Discharge: HOME | End: 2020-08-28
Attending: OBSTETRICS & GYNECOLOGY | Admitting: OBSTETRICS & GYNECOLOGY
Payer: COMMERCIAL

## 2020-08-26 ENCOUNTER — ANESTHESIA (OUTPATIENT)
Dept: OBGYN | Facility: CLINIC | Age: 35
End: 2020-08-26
Payer: COMMERCIAL

## 2020-08-26 ENCOUNTER — ANESTHESIA EVENT (OUTPATIENT)
Dept: OBGYN | Facility: CLINIC | Age: 35
End: 2020-08-26
Payer: COMMERCIAL

## 2020-08-26 LAB
ABO + RH BLD: NORMAL
ABO + RH BLD: NORMAL
HGB BLD-MCNC: 11.9 G/DL (ref 11.7–15.7)
RUPTURE OF FETAL MEMBRANES BY ROM PLUS: NEGATIVE
SARS-COV-2 RNA SPEC QL NAA+PROBE: NOT DETECTED
SPECIMEN EXP DATE BLD: NORMAL
SPECIMEN SOURCE: NORMAL
T PALLIDUM AB SER QL: NONREACTIVE

## 2020-08-26 PROCEDURE — 36415 COLL VENOUS BLD VENIPUNCTURE: CPT | Performed by: OBSTETRICS & GYNECOLOGY

## 2020-08-26 PROCEDURE — 12000000 ZZH R&B MED SURG/OB

## 2020-08-26 PROCEDURE — 59400 OBSTETRICAL CARE: CPT | Performed by: OBSTETRICS & GYNECOLOGY

## 2020-08-26 PROCEDURE — 3E0R3BZ INTRODUCTION OF ANESTHETIC AGENT INTO SPINAL CANAL, PERCUTANEOUS APPROACH: ICD-10-PCS | Performed by: ANESTHESIOLOGY

## 2020-08-26 PROCEDURE — 86900 BLOOD TYPING SEROLOGIC ABO: CPT | Performed by: OBSTETRICS & GYNECOLOGY

## 2020-08-26 PROCEDURE — 40000671 ZZH STATISTIC ANESTHESIA CASE

## 2020-08-26 PROCEDURE — 00HU33Z INSERTION OF INFUSION DEVICE INTO SPINAL CANAL, PERCUTANEOUS APPROACH: ICD-10-PCS | Performed by: ANESTHESIOLOGY

## 2020-08-26 PROCEDURE — 86780 TREPONEMA PALLIDUM: CPT | Performed by: OBSTETRICS & GYNECOLOGY

## 2020-08-26 PROCEDURE — G0463 HOSPITAL OUTPT CLINIC VISIT: HCPCS

## 2020-08-26 PROCEDURE — 37000011 ZZH ANESTHESIA WARD SERVICE

## 2020-08-26 PROCEDURE — 25000125 ZZHC RX 250: Performed by: OBSTETRICS & GYNECOLOGY

## 2020-08-26 PROCEDURE — 72200001 ZZH LABOR CARE VAGINAL DELIVERY SINGLE

## 2020-08-26 PROCEDURE — 84112 EVAL AMNIOTIC FLUID PROTEIN: CPT | Performed by: OBSTETRICS & GYNECOLOGY

## 2020-08-26 PROCEDURE — 25000132 ZZH RX MED GY IP 250 OP 250 PS 637: Performed by: OBSTETRICS & GYNECOLOGY

## 2020-08-26 PROCEDURE — 25000128 H RX IP 250 OP 636: Performed by: ANESTHESIOLOGY

## 2020-08-26 PROCEDURE — 25800030 ZZH RX IP 258 OP 636: Performed by: OBSTETRICS & GYNECOLOGY

## 2020-08-26 PROCEDURE — 85018 HEMOGLOBIN: CPT | Performed by: OBSTETRICS & GYNECOLOGY

## 2020-08-26 PROCEDURE — 86901 BLOOD TYPING SEROLOGIC RH(D): CPT | Performed by: OBSTETRICS & GYNECOLOGY

## 2020-08-26 RX ORDER — HYDROCORTISONE 2.5 %
CREAM (GRAM) TOPICAL 3 TIMES DAILY PRN
Status: DISCONTINUED | OUTPATIENT
Start: 2020-08-26 | End: 2020-08-28 | Stop reason: HOSPADM

## 2020-08-26 RX ORDER — TRANEXAMIC ACID 10 MG/ML
1 INJECTION, SOLUTION INTRAVENOUS EVERY 30 MIN PRN
Status: DISCONTINUED | OUTPATIENT
Start: 2020-08-26 | End: 2020-08-26

## 2020-08-26 RX ORDER — AMOXICILLIN 250 MG
1 CAPSULE ORAL 2 TIMES DAILY
Status: DISCONTINUED | OUTPATIENT
Start: 2020-08-26 | End: 2020-08-28 | Stop reason: HOSPADM

## 2020-08-26 RX ORDER — NALOXONE HYDROCHLORIDE 0.4 MG/ML
.1-.4 INJECTION, SOLUTION INTRAMUSCULAR; INTRAVENOUS; SUBCUTANEOUS
Status: DISCONTINUED | OUTPATIENT
Start: 2020-08-26 | End: 2020-08-28 | Stop reason: HOSPADM

## 2020-08-26 RX ORDER — OXYTOCIN 10 [USP'U]/ML
10 INJECTION, SOLUTION INTRAMUSCULAR; INTRAVENOUS
Status: DISCONTINUED | OUTPATIENT
Start: 2020-08-26 | End: 2020-08-26

## 2020-08-26 RX ORDER — AMOXICILLIN 250 MG
2 CAPSULE ORAL 2 TIMES DAILY
Status: DISCONTINUED | OUTPATIENT
Start: 2020-08-26 | End: 2020-08-28 | Stop reason: HOSPADM

## 2020-08-26 RX ORDER — OXYTOCIN/0.9 % SODIUM CHLORIDE 30/500 ML
340 PLASTIC BAG, INJECTION (ML) INTRAVENOUS CONTINUOUS PRN
Status: DISCONTINUED | OUTPATIENT
Start: 2020-08-26 | End: 2020-08-28 | Stop reason: HOSPADM

## 2020-08-26 RX ORDER — TRANEXAMIC ACID 10 MG/ML
1 INJECTION, SOLUTION INTRAVENOUS EVERY 30 MIN PRN
Status: DISCONTINUED | OUTPATIENT
Start: 2020-08-26 | End: 2020-08-28 | Stop reason: HOSPADM

## 2020-08-26 RX ORDER — OXYTOCIN 10 [USP'U]/ML
10 INJECTION, SOLUTION INTRAMUSCULAR; INTRAVENOUS
Status: DISCONTINUED | OUTPATIENT
Start: 2020-08-26 | End: 2020-08-28 | Stop reason: HOSPADM

## 2020-08-26 RX ORDER — SODIUM CHLORIDE, SODIUM LACTATE, POTASSIUM CHLORIDE, CALCIUM CHLORIDE 600; 310; 30; 20 MG/100ML; MG/100ML; MG/100ML; MG/100ML
INJECTION, SOLUTION INTRAVENOUS CONTINUOUS
Status: DISCONTINUED | OUTPATIENT
Start: 2020-08-26 | End: 2020-08-26

## 2020-08-26 RX ORDER — CARBOPROST TROMETHAMINE 250 UG/ML
250 INJECTION, SOLUTION INTRAMUSCULAR
Status: DISCONTINUED | OUTPATIENT
Start: 2020-08-26 | End: 2020-08-26

## 2020-08-26 RX ORDER — PRENATAL VIT/IRON FUM/FOLIC AC 27MG-0.8MG
1 TABLET ORAL DAILY
Status: DISCONTINUED | OUTPATIENT
Start: 2020-08-27 | End: 2020-08-28 | Stop reason: HOSPADM

## 2020-08-26 RX ORDER — IBUPROFEN 800 MG/1
800 TABLET, FILM COATED ORAL
Status: COMPLETED | OUTPATIENT
Start: 2020-08-26 | End: 2020-08-26

## 2020-08-26 RX ORDER — IBUPROFEN 800 MG/1
800 TABLET, FILM COATED ORAL EVERY 6 HOURS PRN
Status: DISCONTINUED | OUTPATIENT
Start: 2020-08-26 | End: 2020-08-28 | Stop reason: HOSPADM

## 2020-08-26 RX ORDER — OXYTOCIN/0.9 % SODIUM CHLORIDE 30/500 ML
1-24 PLASTIC BAG, INJECTION (ML) INTRAVENOUS CONTINUOUS
Status: DISCONTINUED | OUTPATIENT
Start: 2020-08-26 | End: 2020-08-26

## 2020-08-26 RX ORDER — BISACODYL 10 MG
10 SUPPOSITORY, RECTAL RECTAL DAILY PRN
Status: DISCONTINUED | OUTPATIENT
Start: 2020-08-28 | End: 2020-08-28 | Stop reason: HOSPADM

## 2020-08-26 RX ORDER — OXYTOCIN/0.9 % SODIUM CHLORIDE 30/500 ML
100 PLASTIC BAG, INJECTION (ML) INTRAVENOUS CONTINUOUS
Status: DISCONTINUED | OUTPATIENT
Start: 2020-08-26 | End: 2020-08-28 | Stop reason: HOSPADM

## 2020-08-26 RX ORDER — OXYCODONE AND ACETAMINOPHEN 5; 325 MG/1; MG/1
1 TABLET ORAL
Status: DISCONTINUED | OUTPATIENT
Start: 2020-08-26 | End: 2020-08-26

## 2020-08-26 RX ORDER — NALBUPHINE HYDROCHLORIDE 20 MG/ML
2.5-5 INJECTION, SOLUTION INTRAMUSCULAR; INTRAVENOUS; SUBCUTANEOUS EVERY 6 HOURS PRN
Status: DISCONTINUED | OUTPATIENT
Start: 2020-08-26 | End: 2020-08-26 | Stop reason: HOSPADM

## 2020-08-26 RX ORDER — ACETAMINOPHEN 325 MG/1
650 TABLET ORAL EVERY 4 HOURS PRN
Status: DISCONTINUED | OUTPATIENT
Start: 2020-08-26 | End: 2020-08-28 | Stop reason: HOSPADM

## 2020-08-26 RX ORDER — ONDANSETRON 4 MG/1
4 TABLET, ORALLY DISINTEGRATING ORAL EVERY 6 HOURS PRN
Status: DISCONTINUED | OUTPATIENT
Start: 2020-08-26 | End: 2020-08-26 | Stop reason: HOSPADM

## 2020-08-26 RX ORDER — EPHEDRINE SULFATE 50 MG/ML
5 INJECTION, SOLUTION INTRAMUSCULAR; INTRAVENOUS; SUBCUTANEOUS
Status: DISCONTINUED | OUTPATIENT
Start: 2020-08-26 | End: 2020-08-26 | Stop reason: HOSPADM

## 2020-08-26 RX ORDER — MISOPROSTOL 200 UG/1
800 TABLET ORAL
Status: DISCONTINUED | OUTPATIENT
Start: 2020-08-26 | End: 2020-08-28 | Stop reason: HOSPADM

## 2020-08-26 RX ORDER — ONDANSETRON 2 MG/ML
4 INJECTION INTRAMUSCULAR; INTRAVENOUS EVERY 6 HOURS PRN
Status: DISCONTINUED | OUTPATIENT
Start: 2020-08-26 | End: 2020-08-26 | Stop reason: HOSPADM

## 2020-08-26 RX ORDER — NALOXONE HYDROCHLORIDE 0.4 MG/ML
.1-.4 INJECTION, SOLUTION INTRAMUSCULAR; INTRAVENOUS; SUBCUTANEOUS
Status: DISCONTINUED | OUTPATIENT
Start: 2020-08-26 | End: 2020-08-26 | Stop reason: HOSPADM

## 2020-08-26 RX ORDER — ONDANSETRON 2 MG/ML
4 INJECTION INTRAMUSCULAR; INTRAVENOUS EVERY 6 HOURS PRN
Status: DISCONTINUED | OUTPATIENT
Start: 2020-08-26 | End: 2020-08-26

## 2020-08-26 RX ORDER — FERROUS SULFATE 325(65) MG
325 TABLET ORAL DAILY
Status: DISCONTINUED | OUTPATIENT
Start: 2020-08-27 | End: 2020-08-28 | Stop reason: HOSPADM

## 2020-08-26 RX ORDER — FENTANYL CITRATE 50 UG/ML
50-100 INJECTION, SOLUTION INTRAMUSCULAR; INTRAVENOUS
Status: DISCONTINUED | OUTPATIENT
Start: 2020-08-26 | End: 2020-08-26

## 2020-08-26 RX ORDER — MODIFIED LANOLIN
OINTMENT (GRAM) TOPICAL
Status: DISCONTINUED | OUTPATIENT
Start: 2020-08-26 | End: 2020-08-28 | Stop reason: HOSPADM

## 2020-08-26 RX ORDER — METHYLERGONOVINE MALEATE 0.2 MG/ML
200 INJECTION INTRAVENOUS
Status: DISCONTINUED | OUTPATIENT
Start: 2020-08-26 | End: 2020-08-26

## 2020-08-26 RX ORDER — OXYTOCIN/0.9 % SODIUM CHLORIDE 30/500 ML
100-340 PLASTIC BAG, INJECTION (ML) INTRAVENOUS CONTINUOUS PRN
Status: DISCONTINUED | OUTPATIENT
Start: 2020-08-26 | End: 2020-08-26

## 2020-08-26 RX ORDER — ACETAMINOPHEN 325 MG/1
650 TABLET ORAL EVERY 4 HOURS PRN
Status: DISCONTINUED | OUTPATIENT
Start: 2020-08-26 | End: 2020-08-26

## 2020-08-26 RX ORDER — NALOXONE HYDROCHLORIDE 0.4 MG/ML
.1-.4 INJECTION, SOLUTION INTRAMUSCULAR; INTRAVENOUS; SUBCUTANEOUS
Status: DISCONTINUED | OUTPATIENT
Start: 2020-08-26 | End: 2020-08-26

## 2020-08-26 RX ORDER — LIDOCAINE 40 MG/G
CREAM TOPICAL
Status: DISCONTINUED | OUTPATIENT
Start: 2020-08-26 | End: 2020-08-26

## 2020-08-26 RX ADMIN — SODIUM CHLORIDE, POTASSIUM CHLORIDE, SODIUM LACTATE AND CALCIUM CHLORIDE: 600; 310; 30; 20 INJECTION, SOLUTION INTRAVENOUS at 11:14

## 2020-08-26 RX ADMIN — SODIUM CHLORIDE, POTASSIUM CHLORIDE, SODIUM LACTATE AND CALCIUM CHLORIDE: 600; 310; 30; 20 INJECTION, SOLUTION INTRAVENOUS at 13:06

## 2020-08-26 RX ADMIN — Medication: at 12:53

## 2020-08-26 RX ADMIN — ACETAMINOPHEN 650 MG: 325 TABLET, FILM COATED ORAL at 21:07

## 2020-08-26 RX ADMIN — IBUPROFEN 800 MG: 800 TABLET, FILM COATED ORAL at 17:53

## 2020-08-26 RX ADMIN — Medication 2 MILLI-UNITS/MIN: at 11:18

## 2020-08-26 RX ADMIN — IBUPROFEN 800 MG: 800 TABLET ORAL at 23:59

## 2020-08-26 NOTE — H&P
No significant change in general health status based on examination of the patient, review of Nursing Admission Database and prenatal record.    EFW: 7lb     Here for rule out labor/SROM--ROM plus negative and not grossly ruptured on exam, but getting more uncomfortable with contractions. Given postdates status and change in cervix since her clinic exam, she desired to be admitted if possible for labor augmentation.    Risks, benefits, and alternatives reviewed.  Plan:    -Pitocin augmentation.  -ROM with clear return of fluid noted.  -Plans epidural  -Anticipate vaginal delivery.    Ruth Ann Jimenez MD

## 2020-08-26 NOTE — PLAN OF CARE
Data: [facility] presented to [facility]: 2020  8:59 AM.  Reason for maternal/fetal assessment is leaking vaginal fluid. Patient reports leaking clear fluid since 0800.  Patient is a .  Prenatal record reviewed. Pregnancy  has been complicated by anemia .  Gestational Age 40w2d. VSS. Fetal movement present. Patient denies uterine contractions, vaginal bleeding, abdominal pain, pelvic pressure, nausea, vomiting, headache, visual disturbances, epigastric or URQ pain, significant edema. Support person is present.   Action: Verbal consent for EFM. Triage assessment completed. Bill of rights reviewed.  Response: Patient verbalized agreement with plan. Will contact Dr Ruth Ann Jimenez with update and further orders.      Talked with Dr. Jimenez by phone. Reported negative ROM plus results, FHR, contractions, and vitals. Will check cervix and discuss plan with patient and MD. Patient can be discharged home if cervix unchanged from clinic.

## 2020-08-26 NOTE — TELEPHONE ENCOUNTER
Pt calling. Had a gush of clear fluid this am.   No contractions noted.   Baby is active.     Pt advised to go to [facility] L&D.     md on-call notified via text page.     Emily SHER RN

## 2020-08-26 NOTE — L&D DELIVERY NOTE
VAGINAL DELIVERY PROCEDURE NOTE    PREOPERATIVE DIAGNOSIS:  1. Intrauterine pregnancy at 40w2d  2. Latent labor, augmented    POSTOPERATIVE DIAGNOSIS:   1. Status post   2. First degree perineal laceration    PROCEDURE DATE: 2020    PROCEDURE:   1.     STAFF SURGEON: Ruth Ann Jimenez MD    ANESTHESIA: epidural    COMPLICATIONS: none    QBL: pendin     SPECIMENS: cord blood    FINDINGS:  male infant, Apgar scores of 9 and 9.  Delivered in CHRISTAL presentation. Placenta with 3 vessel cord. Meconium: none.      INDICATIONS:  This is a 35 year old  with intrauterine pregnancy at 40w2d who presented to Labor and Delivery in latent labor, and as she was postterm, desired labor augmentation. Her pregnancy has been uncomplicated.     PROCEDURE:  The patient was complete and pushing. Infant's head was delivered atraumatically over perineum in the CHRISTAL position. Nuchal cord: none. Anterior shoulder and posterior shoulders were easily delivered without problems followed by remainder of infant. Infant vigorous and crying. Drying and resuscitative measures performed. Delayed cord clamping was  performed prior to cutting. Cord gases were not obtained. Cord blood was collected. Pitocin in IV fluid was administered per protocol. The placenta delivered spontaneously intact with 3 vessel cord and revealed normal anatomy. Uterine massage was performed and the fundus was found to be firm. Vagina, cervix, and perineum were inspected for lacerations. First laceration was noted and was not bleeding so did not require repair.  All counts were correct. Mom and baby stable in room.    Primary OB: Dr. Kenneth Jimenez MD  5:41 PM  2020

## 2020-08-26 NOTE — PROVIDER NOTIFICATION
08/26/20 1019   Provider Notification   Provider Name/Title Dr. Jimenez   Method of Notification Phone   Received admission orders.  
   08/26/20 1459   Provider Notification   Provider Name/Title Dr. Jimenez   Method of Notification Electronic Page   Notification Reason Status Update   SVE 5/90/-1. Moderate variability, occasional early and variable, contractions 1.5-3 min apart.   
General

## 2020-08-26 NOTE — ANESTHESIA PREPROCEDURE EVALUATION
Anesthesia Pre-Procedure Evaluation    Patient: Ana Jackson   MRN: [id number] : 1985          Preoperative Diagnosis: * No surgery found *        Past Medical History:   Diagnosis Date     Closed fracture of unspecified part of forearm     (L)     Herpangina 89     Migraine, unspecified, without mention of intractable migraine without mention of status migrainosus      Past Surgical History:   Procedure Laterality Date     HC TOOTH EXTRACTION W/FORCEP       LYMPH NODE BIOPSY  2018     Anesthesia Evaluation       history and physical reviewed .      No history of anesthetic complications          ROS/MED HX    ENT/Pulmonary:  - neg pulmonary ROS     Neurologic:  - neg neurologic ROS     Cardiovascular:         METS/Exercise Tolerance:     Hematologic:         Musculoskeletal:         GI/Hepatic:     (+) GERD       Renal/Genitourinary:         Endo:         Psychiatric:         Infectious Disease:         Malignancy:         Other:                       (-) no pre-eclampsia and gestational diabetes          Physical Exam  Normal systems: cardiovascular and pulmonary    Airway   Mallampati: II  TM distance: > 3 FB  Neck ROM: full  Mouth opening: > 3 cm    Dental     Cardiovascular       Pulmonary     Other findings: Lab Test        20                       1114          0855          1047          0912          1505          WBC           --          9.1           --          8.1          10.7          HGB          11.9         10.5*        11.0*        10.6*        11.8          MCV           --          92            --          83           93            PLT           --          294           --          306          311            Lab Test        05/26/17     12/22/15                       0230          0220          NA           138          137           POTASSIUM    3.3*         3.8           CHLORIDE     107          104        "    CO2          23           22            BUN          13           16            CR           0.66         0.62          ANIONGAP     8            11            JENNIFER          8.6          9.2           GLC          102*         163*                Lab Results   Component Value Date    WBC 9.1 06/04/2020    HGB 11.9 08/26/2020    HCT 32.4 (L) 06/04/2020     06/04/2020    CRP <2.9 01/16/2017    SED 9 01/16/2017     05/26/2017    POTASSIUM 3.3 (L) 05/26/2017    CHLORIDE 107 05/26/2017    CO2 23 05/26/2017    BUN 13 05/26/2017    CR 0.66 05/26/2017     (H) 05/26/2017    JENNIFER 8.6 05/26/2017    PHOS 4.4 02/14/2003    MAG 2.1 02/14/2003    ALBUMIN 4.0 12/22/2015    PROTTOTAL 8.0 12/22/2015    ALT 20 12/22/2015    AST 14 12/22/2015    ALKPHOS 84 12/22/2015    BILITOTAL 0.7 12/22/2015    LIPASE 68 (L) 12/22/2015    TSH 2.46 10/26/2015    HCG Negative 05/26/2017    HCGS Negative 12/22/2015       Preop Vitals  BP Readings from Last 3 Encounters:   08/26/20 133/69   08/24/20 126/68   08/17/20 118/60    Pulse Readings from Last 3 Encounters:   08/26/20 87   08/03/20 92   06/04/20 108      Resp Readings from Last 3 Encounters:   02/19/20 16   05/26/17 20   01/16/17 16    SpO2 Readings from Last 3 Encounters:   02/19/20 100%   04/07/19 99%   02/21/19 97%      Temp Readings from Last 1 Encounters:   08/26/20 98.5  F (36.9  C) (Oral)    Ht Readings from Last 1 Encounters:   01/21/20 1.664 m (5' 5.5\")      Wt Readings from Last 1 Encounters:   08/24/20 73.8 kg (162 lb 12.8 oz)    Estimated body mass index is 26.68 kg/m  as calculated from the following:    Height as of 1/21/20: 1.664 m (5' 5.5\").    Weight as of 8/24/20: 73.8 kg (162 lb 12.8 oz).       Anesthesia Plan      History & Physical Review  History and physical reviewed and following examination; no interval change.    ASA Status:  2 .  OB Epidural Asa: 2       Plan for Epidural     PCEA        Postoperative Care      Consents  Anesthetic plan, risks, " benefits and alternatives discussed with:  Patient..                 John Mcelroy MD                    .

## 2020-08-26 NOTE — ANESTHESIA PROCEDURE NOTES
Procedure note : epidural catheter  Staff -   Anesthesiologist:  Jeremiah Barrera MD      Performed By: anesthesiologist    Referred By: kathy    Pre-Procedure  Performed by Jeremiah Barrera MD  Referred by kathy  Location: OB    Procedure Times:8/26/2020 12:34 PM and 8/26/2020 12:54 PM  Pre-Anesthestic Checklist: patient identified, IV checked, risks and benefits discussed, informed consent, monitors and equipment checked, pre-op evaluation and at physician/surgeon's request    Timeout  Correct Patient: Yes   Correct Procedure: Yes   Correct Site: Yes   Correct Laterality: N/A   Correct Position: Yes   Site Marked: N/A   .   Procedure Documentation    Diagnosis:labor.    Procedure: epidural catheter, .   Patient Position:sitting Insertion Site:L3-4  (midline approach) Injection technique: LORT air   Local skin infiltrated with 4 mL of 1% lidocaine.  ARABELLA at 6 cm    Patient Prep/Sterile Barriers; mask, sterile gloves, povidone-iodine 7.5% surgical scrub, patient draped.  .  Needle: Touhy needle   Needle Gauge: 17.    Needle Length (Inches) 3.5   # of attempts: 2 and # of redirects:  .    Catheter: 19 G . .  Catheter threaded easily  .  11 cm at skin.   .    Assessment/Narrative  Paresthesias: No.  .  .  Aspiration negative for heme or CSF  . Test dose of 3 mL lidocaine 1.5% w/ 1:200,000 epinephrine at. Test dose negative for signs of intravascular, subdural or intrathecal injection. Comments:  Bolus 10cc .25marc

## 2020-08-27 LAB — HGB BLD-MCNC: 11 G/DL (ref 11.7–15.7)

## 2020-08-27 PROCEDURE — 36415 COLL VENOUS BLD VENIPUNCTURE: CPT | Performed by: OBSTETRICS & GYNECOLOGY

## 2020-08-27 PROCEDURE — 85018 HEMOGLOBIN: CPT | Performed by: OBSTETRICS & GYNECOLOGY

## 2020-08-27 PROCEDURE — 12000000 ZZH R&B MED SURG/OB

## 2020-08-27 PROCEDURE — 25000132 ZZH RX MED GY IP 250 OP 250 PS 637: Performed by: OBSTETRICS & GYNECOLOGY

## 2020-08-27 RX ORDER — SENNA AND DOCUSATE SODIUM 50; 8.6 MG/1; MG/1
1 TABLET, FILM COATED ORAL AT BEDTIME
Qty: 30 TABLET | Refills: 0 | Status: SHIPPED | OUTPATIENT
Start: 2020-08-27 | End: 2021-05-24

## 2020-08-27 RX ORDER — ACETAMINOPHEN 500 MG
500-1000 TABLET ORAL EVERY 6 HOURS PRN
Qty: 30 TABLET | Refills: 0 | Status: SHIPPED | OUTPATIENT
Start: 2020-08-27 | End: 2022-07-14

## 2020-08-27 RX ORDER — HYDROCODONE BITARTRATE AND ACETAMINOPHEN 5; 325 MG/1; MG/1
1 TABLET ORAL EVERY 6 HOURS PRN
Status: DISCONTINUED | OUTPATIENT
Start: 2020-08-27 | End: 2020-08-28 | Stop reason: HOSPADM

## 2020-08-27 RX ORDER — IBUPROFEN 600 MG/1
600 TABLET, FILM COATED ORAL EVERY 6 HOURS PRN
Qty: 30 TABLET | Refills: 0 | Status: SHIPPED | OUTPATIENT
Start: 2020-08-27 | End: 2022-07-14

## 2020-08-27 RX ADMIN — IBUPROFEN 800 MG: 800 TABLET ORAL at 19:34

## 2020-08-27 RX ADMIN — HYDROCODONE BITARTRATE AND ACETAMINOPHEN 1 TABLET: 5; 325 TABLET ORAL at 20:09

## 2020-08-27 RX ADMIN — IBUPROFEN 800 MG: 800 TABLET ORAL at 12:56

## 2020-08-27 RX ADMIN — ACETAMINOPHEN 650 MG: 325 TABLET, FILM COATED ORAL at 01:23

## 2020-08-27 RX ADMIN — ACETAMINOPHEN 650 MG: 325 TABLET, FILM COATED ORAL at 09:46

## 2020-08-27 RX ADMIN — FERROUS SULFATE TAB 325 MG (65 MG ELEMENTAL FE) 325 MG: 325 (65 FE) TAB at 09:46

## 2020-08-27 RX ADMIN — DOCUSATE SODIUM 50 MG AND SENNOSIDES 8.6 MG 2 TABLET: 8.6; 5 TABLET, FILM COATED ORAL at 20:09

## 2020-08-27 RX ADMIN — IBUPROFEN 800 MG: 800 TABLET ORAL at 06:24

## 2020-08-27 RX ADMIN — PRENATAL VITAMINS-IRON FUMARATE 27 MG IRON-FOLIC ACID 0.8 MG TABLET 1 TABLET: at 09:46

## 2020-08-27 RX ADMIN — HYDROCODONE BITARTRATE AND ACETAMINOPHEN 1 TABLET: 5; 325 TABLET ORAL at 14:13

## 2020-08-27 RX ADMIN — DOCUSATE SODIUM 50 MG AND SENNOSIDES 8.6 MG 1 TABLET: 8.6; 5 TABLET, FILM COATED ORAL at 09:47

## 2020-08-27 NOTE — ANESTHESIA POSTPROCEDURE EVALUATION
S/P epidural for labor.   I or my partner was immediately available for management of this patient during epidural analgesia infusion.  VSS.  Doing well. Block resolved.  Neuro at baseline. Denies positional headache. Minimal side effects easily managed w/ PRN meds. No apparent anesthetic complications. No follow-up required.    Jeremiah Barrera, MDPatient: Ana Jackson    * No procedures listed *    Diagnosis:* No pre-op diagnosis entered *  Diagnosis Additional Information: No value filed.    Anesthesia Type:  Epidural    Note:  Anesthesia Post Evaluation    Last vitals:  Vitals:    08/26/20 2030 08/26/20 2237 08/27/20 0123   BP: 119/64 (!) 84/47 101/67   Pulse:  78 76   Resp:  16 16   Temp:  98.4  F (36.9  C) 98.3  F (36.8  C)   SpO2:            Electronically Signed By: Jeremiah Barrera MD  August 27, 2020  6:40 AM

## 2020-08-27 NOTE — PLAN OF CARE
Data: 2050 up to BR without any symptoms,voided spontaneously,matthew care done by herself.   Ana Cherry Box transferred to 450 via wheelchair at 2110. Baby transferred via parent's arms.  Action: Receiving unit notified of transfer: Yes. Patient and family notified of room change. Report given to Hayde at 2110. Belongings sent to receiving unit. Accompanied by Registered Nurse. Oriented patient to surroundings. Call light within reach. ID bands double-checked with receiving RN.  Response: Patient tolerated transfer and is stable.

## 2020-08-27 NOTE — PLAN OF CARE
Pt able to get small periods of rest between cares w/  rooming-in.  States ordered pain medications helping keeping her comfortable.  Using heating pad for uterine cramping, as well.  Independent w/ self cares.  FOB present and supportive of baby cares.

## 2020-08-27 NOTE — PLAN OF CARE
Patient meeting expected goals. Bonding well with . Complaining of cramping using tylenol, ibuprofen, and norco. Education taught to patient and patient verbalized understanding.Plan to discharge tomorrow.

## 2020-08-27 NOTE — PLAN OF CARE
Pt transferred to PP unit in stable condition.  Oriented to room, unit routines/safety and plan of care.  Questions/concerns addressed.  VSS and assessment WNL.  Pt breastfeeding infant well.  Voiding without difficulty and tolerating activity.  Positive bonding observed with infant.  Spouse at bedside, supportive of couplet.  Pt stable; continue with current plan of care.     Patients mobililty level scored using the bedside mobility assistance tool (BMAT). Patient is at a mobility level test number: 4. Mobility equipment used: none required. Required assist of 0 staff members. Further use of BMAT scoring not required.

## 2020-08-27 NOTE — PROGRESS NOTES
PPD#1  Doing well.   Feels intense cramping from time to time especially following breast feeding. Just taking ibuprofen and tylenol but has not taken narcotic based pain medication. But for the most part her pain is well controlled.. Tolerating regular diet. Voiding well. Ambulating without difficulty. Lochia is scant. Breast feeding.     Vitals:    20 2030 207 20 0123   BP: 112/59 119/64 (!) 84/47 101/67   Pulse:   78 76   Resp:   16 16   Temp:   98.4  F (36.9  C) 98.3  F (36.8  C)   TempSrc:   Oral Oral   SpO2:         General Appearance: NAD  Abdomen: Soft, NT, ND. Fundus firm at U-2  Extremities: NT, trace edema    Hemoglobin   Date Value Ref Range Status   2020 11.9 11.7 - 15.7 g/dL Final   2020 10.5 (L) 11.7 - 15.7 g/dL Final   ]    A/P: 35 year old  PPD#1 s/p . Hemodynamically stable.   -- discussed intense cramping and discussed the idea with patient of trial of narcotic pain medication for relief of her pain; patient will consider this   -- discussed discharge and patient will continue to contemplate if she would go home today or tomorrow; she will inform us later of her decision  -- reviewed outpatient postpartum precautions and expectations  -- otherwise, routine postpartum cares  -- will await patient decision to go home today or tomorrow and place discharge order    Renea Noland MD  [facility]

## 2020-08-28 VITALS
DIASTOLIC BLOOD PRESSURE: 65 MMHG | TEMPERATURE: 97.8 F | RESPIRATION RATE: 18 BRPM | HEART RATE: 82 BPM | OXYGEN SATURATION: 100 % | SYSTOLIC BLOOD PRESSURE: 126 MMHG

## 2020-08-28 PROCEDURE — 25000132 ZZH RX MED GY IP 250 OP 250 PS 637: Performed by: OBSTETRICS & GYNECOLOGY

## 2020-08-28 RX ADMIN — IBUPROFEN 800 MG: 800 TABLET ORAL at 01:25

## 2020-08-28 RX ADMIN — PRENATAL VITAMINS-IRON FUMARATE 27 MG IRON-FOLIC ACID 0.8 MG TABLET 1 TABLET: at 08:34

## 2020-08-28 RX ADMIN — DOCUSATE SODIUM 50 MG AND SENNOSIDES 8.6 MG 1 TABLET: 8.6; 5 TABLET, FILM COATED ORAL at 08:34

## 2020-08-28 RX ADMIN — FERROUS SULFATE TAB 325 MG (65 MG ELEMENTAL FE) 325 MG: 325 (65 FE) TAB at 08:34

## 2020-08-28 RX ADMIN — IBUPROFEN 800 MG: 800 TABLET ORAL at 08:34

## 2020-08-28 NOTE — PLAN OF CARE
Patient stable and discharged to home with  and . AVS reviewed. Home medications given: ibuprofen, Tylenol, and senna S. Declined breast pump for home. All questions answered.

## 2020-08-28 NOTE — PLAN OF CARE
VSS. Up ad daquan. Breastfeeding and tolerating well, also hand expressing and spoon feeding after. Scant amount of lochia, no clots noted. Ibuprofen and Norco for pain. BS audible/active x4, tolerating PO, denies N/V. Voiding. Bonding well with baby, attentive to cares. Significant other at bedside and supportive. Continue with plan of care.

## 2020-08-28 NOTE — DISCHARGE SUMMARY
[facility] Obstetrics Post-Partum Progress Note          Assessment and Plan:    Assessment:   Post-partum day #2  Normal spontaneous vaginal delivery  L&D complications: None      Doing well.  No excessive bleeding  Pain well-controlled.      Plan:   Discharge later today  Follow up 6 weeks           Interval History:   Doing well.  Pain is well-controlled.  No fevers.  No history of foul-smelling vaginal discharge.  Good appetite.  Denies chest pain, shortness of breath, nausea or vomiting.  Vaginal bleeding is similar to a heavy menstrual flow.  Ambulatory.  Breastfeeding well.          Significant Problems:      Past Medical History:   Diagnosis Date     Closed fracture of unspecified part of forearm 1994    (L)     Herpangina 8/4/89     Migraine, unspecified, without mention of intractable migraine without mention of status migrainosus                    Physical Exam:     All vitals stable  Patient Vitals for the past 12 hrs:   BP Temp Temp src Pulse Resp   08/28/20 0107 107/61 97.7  F (36.5  C) Oral 77 18     Uterine fundus is firm, non-tender and at the level of the umbilicus  Ext NT     Baldomero Akhtar MD  8/28/2020 8:31 AM

## 2020-08-28 NOTE — DISCHARGE INSTRUCTIONS
Postpartum Vaginal Delivery Instructions    Lactation: [phone]    Activity       Ask family and friends for help when you need it.    Do not place anything in your vagina for 6 weeks.    You are not restricted on other activities, but take it easy for a few weeks to allow your body to recover from delivery.  You are able to do any activities you feel up to that point.    No driving until you have stopped taking your pain medications (usually two weeks after delivery).     Call your health care provider if you have any of these symptoms:       Increased pain, swelling, redness, or fluid around your stiches from an episiotomy or perineal tear.    A fever above 100.4 F (38 C) with or without chills when placing a thermometer under your tongue.    You soak a sanitary pad with blood within 1 hour, or you see blood clots larger than a golf ball.    Bleeding that lasts more than 6 weeks.    Vaginal discharge that smells bad.    Severe pain, cramping or tenderness in your lower belly area.    A need to urinate more frequently (use the toilet more often), more urgently (use the toilet very quickly), or it burns when you urinate.    Nausea and vomiting.    Redness, swelling or pain around a vein in your leg.    Problems breastfeeding or a red or painful area on your breast.    Chest pain and cough or are gasping for air.    Problems coping with sadness, anxiety, or depression.  If you have any concerns about hurting yourself or the baby, call your provider immediately.     You have questions or concerns after you return home.     Keep your hands clean:  Always wash your hands before touching your perineal area and stitches.  This helps reduce your risk of infection.  If your hands aren't dirty, you may use an alcohol hand-rub to clean your hands. Keep your nails clean and short.

## 2020-11-08 ENCOUNTER — HEALTH MAINTENANCE LETTER (OUTPATIENT)
Age: 35
End: 2020-11-08

## 2021-05-24 ENCOUNTER — OFFICE VISIT (OUTPATIENT)
Dept: PEDIATRICS | Facility: CLINIC | Age: 36
End: 2021-05-24
Payer: COMMERCIAL

## 2021-05-24 ENCOUNTER — TELEPHONE (OUTPATIENT)
Dept: PEDIATRICS | Facility: CLINIC | Age: 36
End: 2021-05-24

## 2021-05-24 VITALS
OXYGEN SATURATION: 98 % | BODY MASS INDEX: 22.96 KG/M2 | SYSTOLIC BLOOD PRESSURE: 126 MMHG | TEMPERATURE: 98.3 F | HEART RATE: 56 BPM | DIASTOLIC BLOOD PRESSURE: 72 MMHG | WEIGHT: 140.1 LBS

## 2021-05-24 DIAGNOSIS — Z86.2 HISTORY OF ANEMIA: ICD-10-CM

## 2021-05-24 DIAGNOSIS — Z13.220 LIPID SCREENING: ICD-10-CM

## 2021-05-24 DIAGNOSIS — Z30.011 ENCOUNTER FOR INITIAL PRESCRIPTION OF CONTRACEPTIVE PILLS: ICD-10-CM

## 2021-05-24 DIAGNOSIS — Z13.1 ENCOUNTER FOR SCREENING EXAMINATION FOR IMPAIRED GLUCOSE REGULATION AND DIABETES MELLITUS: ICD-10-CM

## 2021-05-24 DIAGNOSIS — G43.009 MIGRAINE WITHOUT AURA AND WITHOUT STATUS MIGRAINOSUS, NOT INTRACTABLE: ICD-10-CM

## 2021-05-24 DIAGNOSIS — Z00.00 ROUTINE GENERAL MEDICAL EXAMINATION AT A HEALTH CARE FACILITY: Primary | ICD-10-CM

## 2021-05-24 DIAGNOSIS — Z30.011 ENCOUNTER FOR INITIAL PRESCRIPTION OF CONTRACEPTIVE PILLS: Primary | ICD-10-CM

## 2021-05-24 DIAGNOSIS — Z30.41 ORAL CONTRACEPTIVE PILL SURVEILLANCE: ICD-10-CM

## 2021-05-24 LAB
B-HCG SERPL-ACNC: <1 IU/L (ref 0–5)
CHOLEST SERPL-MCNC: 157 MG/DL
GLUCOSE SERPL-MCNC: 84 MG/DL (ref 70–99)
HDLC SERPL-MCNC: 65 MG/DL
HGB BLD-MCNC: 12.6 G/DL (ref 11.7–15.7)
LDLC SERPL CALC-MCNC: 71 MG/DL
NONHDLC SERPL-MCNC: 93 MG/DL
TRIGL SERPL-MCNC: 111 MG/DL

## 2021-05-24 PROCEDURE — 36415 COLL VENOUS BLD VENIPUNCTURE: CPT | Performed by: INTERNAL MEDICINE

## 2021-05-24 PROCEDURE — 85018 HEMOGLOBIN: CPT | Performed by: INTERNAL MEDICINE

## 2021-05-24 PROCEDURE — 84702 CHORIONIC GONADOTROPIN TEST: CPT | Performed by: INTERNAL MEDICINE

## 2021-05-24 PROCEDURE — 99395 PREV VISIT EST AGE 18-39: CPT | Performed by: INTERNAL MEDICINE

## 2021-05-24 PROCEDURE — 82947 ASSAY GLUCOSE BLOOD QUANT: CPT | Performed by: INTERNAL MEDICINE

## 2021-05-24 PROCEDURE — 80061 LIPID PANEL: CPT | Performed by: INTERNAL MEDICINE

## 2021-05-24 RX ORDER — SUMATRIPTAN 50 MG/1
50 TABLET, FILM COATED ORAL
Qty: 30 TABLET | Refills: 3 | Status: SHIPPED | OUTPATIENT
Start: 2021-05-24 | End: 2022-07-14

## 2021-05-24 RX ORDER — DESOGESTREL AND ETHINYL ESTRADIOL 0.15-0.03
1 KIT ORAL DAILY
Qty: 84 TABLET | Refills: 3 | Status: CANCELLED | OUTPATIENT
Start: 2021-05-24

## 2021-05-24 ASSESSMENT — ENCOUNTER SYMPTOMS
ABDOMINAL PAIN: 0
HEADACHES: 1
HEARTBURN: 0
HEMATOCHEZIA: 0
FREQUENCY: 0
CHILLS: 0
NAUSEA: 0
FEVER: 0
WEAKNESS: 0
NERVOUS/ANXIOUS: 0
EYE PAIN: 0
COUGH: 0
CONSTIPATION: 0
SORE THROAT: 0
PALPITATIONS: 0
JOINT SWELLING: 0
SHORTNESS OF BREATH: 0
DIZZINESS: 0
ARTHRALGIAS: 0
MYALGIAS: 0
DYSURIA: 0
HEMATURIA: 0
PARESTHESIAS: 0
BREAST MASS: 0

## 2021-05-24 NOTE — TELEPHONE ENCOUNTER
Patient went to  and asked about OCP prescription. Dr. Howard did not discuss with patient during visit and there is no OCP on patient's med list. I left message for patient to return call - need to find out if patient wants refill of previous OCP she had been taking ([facility]).     IAM CASTANON MA on 5/24/2021 at 7:56 AM

## 2021-05-24 NOTE — TELEPHONE ENCOUNTER
Happy to send it in - I recommend she get a pregnancy test prior to initiation.  Please advise and help schedule lab only for this.    Keya Howard MD

## 2021-05-24 NOTE — PROGRESS NOTES
SUBJECTIVE:   CC: Ana Jackson is an 35 year old woman who presents for preventive health visit.     Patient has been advised of split billing requirements and indicates understanding: Yes     Healthy Habits:     Getting at least 3 servings of Calcium per day:  Yes    Bi-annual eye exam:  NO    Dental care twice a year:  NO    Sleep apnea or symptoms of sleep apnea:  None    Diet:  Regular (no restrictions)    Frequency of exercise:  2-3 days/week    Duration of exercise:  30-45 minutes    Taking medications regularly:  Yes    Barriers to taking medications:  None    Medication side effects:  Not applicable    PHQ-2 Total Score: 0    Additional concerns today:  No    Migraines:  Got slightly worse during pregnancy - now have gotten better but more frequent than before pregnancy.  About 2 migraines per month that she is not able to control - other times is able to prevent migraines by taking abortive med at onset.  Excedrin works very well.    Today's PHQ-2 Score:   PHQ-2 ( 1999 Pfizer) 5/24/2021   Q1: Little interest or pleasure in doing things 0   Q2: Feeling down, depressed or hopeless 0   PHQ-2 Score 0   Q1: Little interest or pleasure in doing things Not at all   Q2: Feeling down, depressed or hopeless Not at all   PHQ-2 Score 0     Abuse: Current or Past (Physical, Sexual or Emotional) - No  Do you feel safe in your environment? Yes    Have you ever done Advance Care Planning? (For example, a Health Directive, POLST, or a discussion with a medical provider or your loved ones about your wishes): No, advance care planning information given to patient to review.  Patient declined advance care planning discussion at this time.    Social History     Tobacco Use     Smoking status: Former Smoker     Packs/day: 0.50     Years: 2.00     Pack years: 1.00     Types: Cigarettes     Quit date: 11/1/2005     Years since quitting: 15.5     Smokeless tobacco: Never Used     Tobacco comment: NO 2nd hand smoke at work    Substance Use Topics     Alcohol use: Not Currently     Alcohol/week: 0.0 standard drinks     Comment: 1-2 per week      If you drink alcohol do you typically have >3 drinks per day or >7 drinks per week? No    Alcohol Use 5/24/2021   Prescreen: >3 drinks/day or >7 drinks/week? No   Prescreen: >3 drinks/day or >7 drinks/week? -     Reviewed orders with patient.  Reviewed health maintenance and updated orders accordingly - Yes  Labs reviewed in EPIC    Breast Cancer Screening:  Any new diagnosis of family breast, ovarian, or bowel cancer? No    FSH-7: No flowsheet data found.      Pertinent mammograms are reviewed under the imaging tab.    History of abnormal Pap smear: NO - age 30-65 PAP every 5 years with negative HPV co-testing recommended  PAP / HPV Latest Ref Rng & Units 2/5/2018 6/17/2016 10/26/2015   PAP - NIL NIL NIL   HPV 16 DNA NEG:Negative Negative Negative Negative   HPV 18 DNA NEG:Negative Negative Negative Negative   OTHER HR HPV NEG:Negative Negative Negative Negative     Reviewed and updated as needed this visit by clinical staff                 Reviewed and updated as needed this visit by Provider                    Review of Systems   Constitutional: Negative for chills and fever.   HENT: Negative for congestion, ear pain, hearing loss and sore throat.    Eyes: Negative for pain and visual disturbance.   Respiratory: Negative for cough and shortness of breath.    Cardiovascular: Negative for chest pain, palpitations and peripheral edema.   Gastrointestinal: Negative for abdominal pain, constipation, heartburn, hematochezia and nausea.   Breasts:  Negative for tenderness, breast mass and discharge.   Genitourinary: Negative for dysuria, frequency, genital sores, hematuria, pelvic pain, urgency, vaginal bleeding and vaginal discharge.   Musculoskeletal: Negative for arthralgias, joint swelling and myalgias.   Skin: Negative for rash.   Neurological: Positive for headaches. Negative for dizziness,  weakness and paresthesias.   Psychiatric/Behavioral: Negative for mood changes. The patient is not nervous/anxious.           OBJECTIVE:   /72 (BP Location: Right arm, Patient Position: Sitting, Cuff Size: Adult Regular)   Pulse 56   Temp 98.3  F (36.8  C) (Temporal)   Wt 63.5 kg (140 lb 1.6 oz)   SpO2 98%   BMI 22.96 kg/m    Physical Exam  GENERAL: healthy, alert and no distress  EYES: Eyes grossly normal to inspection, PERRL and conjunctivae and sclerae normal  HENT: ear canals and TM's normal, nose and mouth without ulcers or lesions  NECK: no adenopathy, no asymmetry, masses, or scars and thyroid normal to palpation  RESP: lungs clear to auscultation - no rales, rhonchi or wheezes  CV: regular rate and rhythm, normal S1 S2, no S3 or S4, no murmur, click or rub, no peripheral edema and peripheral pulses strong  ABDOMEN: soft, nontender, no hepatosplenomegaly, no masses and bowel sounds normal  MS: no gross musculoskeletal defects noted, no edema  SKIN: no suspicious lesions or rashes  NEURO: Normal strength and tone, mentation intact and speech normal  PSYCH: mentation appears normal, affect normal/bright    Diagnostic Test Results:  Labs reviewed in Epic    ASSESSMENT/PLAN:   1. Routine general medical examination at a health care facility  34 yo here for annual preventive health physical.  Reviewed healthy BP and BMI ranges.  Counseled on lifestyle modifications for optimal mental and physical health.  Discussed age-appropriate health maintanence.  Additional concerns addressed.      2. Migraine without aura and without status migrainosus, not intractable  - Stable, no side effects with medications, refilled meds today    - SUMAtriptan (IMITREX) 50 MG tablet; Take 1 tablet (50 mg) by mouth at onset of headache for migraine May repeat in 2 hours. Max 4 tablets/24 hours.  Dispense: 30 tablet; Refill: 3    3. History of anemia  - Hemoglobin    4. Lipid screening  - Lipid panel reflex to direct LDL  "Fasting    5. Encounter for screening examination for impaired glucose regulation and diabetes mellitus  - Glucose    Patient has been advised of split billing requirements and indicates understanding: Yes  COUNSELING:  Reviewed preventive health counseling, as reflected in patient instructions    Estimated body mass index is 26.68 kg/m  as calculated from the following:    Height as of 1/21/20: 1.664 m (5' 5.5\").    Weight as of 8/24/20: 73.8 kg (162 lb 12.8 oz).        She reports that she quit smoking about 15 years ago. Her smoking use included cigarettes. She has a 1.00 pack-year smoking history. She has never used smokeless tobacco.      Counseling Resources:  ATP IV Guidelines  Pooled Cohorts Equation Calculator  Breast Cancer Risk Calculator  BRCA-Related Cancer Risk Assessment: FHS-7 Tool  FRAX Risk Assessment  ICSI Preventive Guidelines  Dietary Guidelines for Americans, 2010  USDA's MyPlate  ASA Prophylaxis  Lung CA Screening    Keya Howard MD  [facility]  "

## 2021-05-24 NOTE — TELEPHONE ENCOUNTER
Patient asked if we would be able to do a blood pregnancy test so she does not have to come in again to leave lab specimen.     I spoke to lab and they are able to add blood pregnancy test.     Hold until results are available.     IAM CASTANON MA on 5/24/2021 at 3:05 PM

## 2021-05-25 RX ORDER — DESOGESTREL AND ETHINYL ESTRADIOL 0.15-0.03
1 KIT ORAL DAILY
Qty: 84 TABLET | Refills: 3 | Status: SHIPPED | OUTPATIENT
Start: 2021-05-25 | End: 2022-05-04

## 2021-05-25 NOTE — RESULT ENCOUNTER NOTE
Dear Ana,    Your lab results are normal (including a pregnancy test - thank you for agreeing to have this done).  I will send a prescription for oral contraceptives to your pharmacy.  At this time, I do not recommend any further changes to your current plan of care.    Please feel free to call with any questions.      Sincerely,    Keya Howard MD

## 2021-06-02 NOTE — TELEPHONE ENCOUNTER
This was addressed in result note dated 5/24/21.    Closing encounter.    IAM CASTANON MA on 6/2/2021 at 1:05 PM

## 2021-09-11 ENCOUNTER — HEALTH MAINTENANCE LETTER (OUTPATIENT)
Age: 36
End: 2021-09-11

## 2022-03-30 ENCOUNTER — MYC MEDICAL ADVICE (OUTPATIENT)
Dept: PEDIATRICS | Facility: CLINIC | Age: 37
End: 2022-03-30
Payer: COMMERCIAL

## 2022-03-30 ENCOUNTER — TELEPHONE (OUTPATIENT)
Dept: PEDIATRICS | Facility: CLINIC | Age: 37
End: 2022-03-30
Payer: COMMERCIAL

## 2022-03-30 NOTE — TELEPHONE ENCOUNTER
Called pt at [phone] to get details(7 pm in [place] now). Pt received Pfizer vaccines on 4/20/21 & 5/12/21. Doesn't look like she received the 3rd booster.     Pt says that she was tested positive for covid on 3/29 & has been quarantining since then. She [place]. She states that [place] embassy requires 7 days quarantine, not 10 days, if traveling from [place]. So, she requests clearance letter to travel on 4/6. Willing to do zoom call with pcp, if required.     Need to notify pt MD's plan of care asap(can leave detailed message in her VM if she is unavailable because of the time difference). Thanks.     Opal RN  Patient Advocate Liason (PAL)  [facility]

## 2022-03-30 NOTE — TELEPHONE ENCOUNTER
Tried calling pt back, for some reason line isn't going through(busy tone). Placed the travel letter in her chart.     Sent MC message with 's advise. Please make sure pt read the MC message tomorrow, if not please call her to pass along the information. Thanks.    Opal, RN  Patient Advocate Liason (PAL)  [facility]

## 2022-03-30 NOTE — TELEPHONE ENCOUNTER
I would recommend writing our standard COVID travel letter. If she wants to discuss with the [place] Embassy leaving prior to a 10 day quarantine, she should work directly with them. Unfortunately, our system policy does not allow for deviation from standard recommendations.     Chloé Hussein MD  Internal Medicine-Pediatrics

## 2022-03-30 NOTE — LETTER
[facility]  [address]  [address] [place] [address]  [phone]  Dept: [phone]        March 30, 2022        Dear Ana Jackson,     You tested positive for COVID-19 on 03/29/2022 (date of result) and before travel, you must complete a full 10 day isolation period. You are cleared to travel on 04/08/2022 (day 11 after positive result).    Please visit the CDC travel web site for additional information: https://www.cdc.gov/coronavirus/2019-ncov/travelers/index.html  Please check your airline carrier/cruise company for their most up-to-date guidelines.          Sincerely,    Chloé Hussein MD  Internal Medicine-Pediatrics

## 2022-03-30 NOTE — TELEPHONE ENCOUNTER
Patient is in [place] and in quarantine because she has COVID, in order to come back to the [place], she said she needs a letter from her PCP saying that it is ok that she comes back to the [facility] [place].  Please call her  [phone]

## 2022-03-30 NOTE — TELEPHONE ENCOUNTER
Please see the other encounter for details.    Opal, RN  Patient Advocate Liason (PAL)  [facility]

## 2022-04-01 NOTE — TELEPHONE ENCOUNTER
Please see the other  message encounter.    Opal RN  Patient Advocate Liason (PAL)  [facility]

## 2022-05-03 DIAGNOSIS — Z30.41 ORAL CONTRACEPTIVE PILL SURVEILLANCE: ICD-10-CM

## 2022-05-04 RX ORDER — DESOGESTREL AND ETHINYL ESTRADIOL 0.15-0.03
1 KIT ORAL DAILY
Qty: 84 TABLET | Refills: 0 | Status: SHIPPED | OUTPATIENT
Start: 2022-05-04 | End: 2022-06-10

## 2022-06-09 ENCOUNTER — VIRTUAL VISIT (OUTPATIENT)
Dept: PEDIATRICS | Facility: CLINIC | Age: 37
End: 2022-06-09
Payer: COMMERCIAL

## 2022-06-09 DIAGNOSIS — R11.0 NAUSEA: ICD-10-CM

## 2022-06-09 DIAGNOSIS — J01.90 ACUTE SINUSITIS WITH SYMPTOMS > 10 DAYS: Primary | ICD-10-CM

## 2022-06-09 DIAGNOSIS — R42 DIZZINESS: ICD-10-CM

## 2022-06-09 PROCEDURE — 99213 OFFICE O/P EST LOW 20 MIN: CPT | Mod: 95 | Performed by: NURSE PRACTITIONER

## 2022-06-09 ASSESSMENT — PAIN SCALES - GENERAL: PAINLEVEL: NO PAIN (0)

## 2022-06-09 NOTE — PROGRESS NOTES
Ana is a 37 year old who is being evaluated via a billable telephone visit.      What phone number would you like to be contacted at? [phone]  How would you like to obtain your AVS? MyChart    Assessment & Plan     Acute sinusitis with symptoms > 10 days  Has had x1 negative home COVID test, recommend repeating this and if negative proceed with abx. Also recommend flonase.  - amoxicillin-clavulanate (AUGMENTIN) 875-125 MG tablet; Take 1 tablet by mouth 2 times daily for 10 days    Dizziness/nausea  Possibly from viral/bacterial illness, PND, sinus pressure, middle ear effusion-no vomiting or red flags. Recommend monitor for now and we discussed emergent s/s and reasons to follow-up sooner.  Regularly takes OCP, denies chance of pregnancy.     There are no Patient Instructions on file for this visit.    Return in about 3 days (around 6/12/2022), or if symptoms worsen or fail to improve.    Gabriela Menezes NP  [facility]    [facility]   Ana is a 37 year old who presents for the following health issues     HPI   Allergies  Onset/Duration: 2 weeks  Symptoms:   Nasal congestion: YES  Sneezing: no  Red, itchy eyes: no  Headache  Nausea  Progression of Symptoms: same worse  Accompanying Signs & Symptoms:  Cough: no  Wheezing: no  Rash: no  Sinus/facial pain: YES  History:   Is it seasonal: in the fall and none   History of Asthma: no  Has allergy testing been done: no  Precipitating factors:   None  Alleviating factors:  None  Therapies tried and outcome: Dayquil made her SX worse    Whole family had COVID in April  Low grade fever 100F a few days  Some dizziness and nausea  On ocp-doesn't miss, denies chance of pregnancy  Home COVID test 1 week ago    Review of Systems   Constitutional, HEENT, cardiovascular, pulmonary, gi and gu systems are negative, except as otherwise noted.      Objective    Vitals - Patient Reported  Pain Score: No Pain (0)      Vitals:  No vitals were obtained  today due to virtual visit.    Physical Exam   healthy, alert and no distress  PSYCH: Alert and oriented times 3; coherent speech, normal   rate and volume, able to articulate logical thoughts, able   to abstract reason, no tangential thoughts, no hallucinations   or delusions  Her affect is normal  RESP: No cough, no audible wheezing, able to talk in full sentences  Remainder of exam unable to be completed due to telephone visits                Phone call duration: 7 minutes

## 2022-07-14 ENCOUNTER — OFFICE VISIT (OUTPATIENT)
Dept: PEDIATRICS | Facility: CLINIC | Age: 37
End: 2022-07-14
Payer: COMMERCIAL

## 2022-07-14 ENCOUNTER — NURSE TRIAGE (OUTPATIENT)
Dept: NURSING | Facility: CLINIC | Age: 37
End: 2022-07-14

## 2022-07-14 VITALS
HEART RATE: 80 BPM | SYSTOLIC BLOOD PRESSURE: 120 MMHG | DIASTOLIC BLOOD PRESSURE: 74 MMHG | RESPIRATION RATE: 16 BRPM | WEIGHT: 138.5 LBS | TEMPERATURE: 98.3 F | BODY MASS INDEX: 23.07 KG/M2 | HEIGHT: 65 IN | OXYGEN SATURATION: 10 %

## 2022-07-14 DIAGNOSIS — Z00.00 ROUTINE GENERAL MEDICAL EXAMINATION AT A HEALTH CARE FACILITY: ICD-10-CM

## 2022-07-14 DIAGNOSIS — N89.8 VAGINAL DISCHARGE: Primary | ICD-10-CM

## 2022-07-14 DIAGNOSIS — Z23 HIGH PRIORITY FOR 2019-NCOV VACCINE: ICD-10-CM

## 2022-07-14 DIAGNOSIS — Z80.0 FAMILY HISTORY OF COLON CANCER: ICD-10-CM

## 2022-07-14 LAB
CLUE CELLS: PRESENT
TRICHOMONAS, WET PREP: ABNORMAL
WBC'S/HIGH POWER FIELD, WET PREP: ABNORMAL
YEAST, WET PREP: ABNORMAL

## 2022-07-14 PROCEDURE — 91305 COVID-19,PF,PFIZER (12+ YRS): CPT | Performed by: STUDENT IN AN ORGANIZED HEALTH CARE EDUCATION/TRAINING PROGRAM

## 2022-07-14 PROCEDURE — 0054A COVID-19,PF,PFIZER (12+ YRS): CPT | Performed by: STUDENT IN AN ORGANIZED HEALTH CARE EDUCATION/TRAINING PROGRAM

## 2022-07-14 PROCEDURE — 99213 OFFICE O/P EST LOW 20 MIN: CPT | Mod: 25 | Performed by: STUDENT IN AN ORGANIZED HEALTH CARE EDUCATION/TRAINING PROGRAM

## 2022-07-14 PROCEDURE — 99395 PREV VISIT EST AGE 18-39: CPT | Mod: GC | Performed by: STUDENT IN AN ORGANIZED HEALTH CARE EDUCATION/TRAINING PROGRAM

## 2022-07-14 PROCEDURE — 87210 SMEAR WET MOUNT SALINE/INK: CPT | Performed by: STUDENT IN AN ORGANIZED HEALTH CARE EDUCATION/TRAINING PROGRAM

## 2022-07-14 RX ORDER — METRONIDAZOLE 500 MG/1
500 TABLET ORAL 2 TIMES DAILY
Qty: 14 TABLET | Refills: 0 | Status: SHIPPED | OUTPATIENT
Start: 2022-07-14 | End: 2022-07-21

## 2022-07-14 SDOH — ECONOMIC STABILITY: FOOD INSECURITY: WITHIN THE PAST 12 MONTHS, THE FOOD YOU BOUGHT JUST DIDN'T LAST AND YOU DIDN'T HAVE MONEY TO GET MORE.: NEVER TRUE

## 2022-07-14 SDOH — ECONOMIC STABILITY: INCOME INSECURITY: HOW HARD IS IT FOR YOU TO PAY FOR THE VERY BASICS LIKE FOOD, HOUSING, MEDICAL CARE, AND HEATING?: NOT HARD AT ALL

## 2022-07-14 SDOH — ECONOMIC STABILITY: INCOME INSECURITY: IN THE LAST 12 MONTHS, WAS THERE A TIME WHEN YOU WERE NOT ABLE TO PAY THE MORTGAGE OR RENT ON TIME?: NO

## 2022-07-14 SDOH — ECONOMIC STABILITY: FOOD INSECURITY: WITHIN THE PAST 12 MONTHS, YOU WORRIED THAT YOUR FOOD WOULD RUN OUT BEFORE YOU GOT MONEY TO BUY MORE.: NEVER TRUE

## 2022-07-14 SDOH — HEALTH STABILITY: PHYSICAL HEALTH: ON AVERAGE, HOW MANY DAYS PER WEEK DO YOU ENGAGE IN MODERATE TO STRENUOUS EXERCISE (LIKE A BRISK WALK)?: 3 DAYS

## 2022-07-14 SDOH — HEALTH STABILITY: PHYSICAL HEALTH: ON AVERAGE, HOW MANY MINUTES DO YOU ENGAGE IN EXERCISE AT THIS LEVEL?: 30 MIN

## 2022-07-14 ASSESSMENT — ENCOUNTER SYMPTOMS
SHORTNESS OF BREATH: 0
CONSTIPATION: 0
FREQUENCY: 0
PARESTHESIAS: 0
ABDOMINAL PAIN: 0
DIZZINESS: 0
SORE THROAT: 0
DIARRHEA: 0
HEADACHES: 0
HEMATURIA: 0
NERVOUS/ANXIOUS: 0
WEAKNESS: 0
BREAST MASS: 0
ARTHRALGIAS: 0
CHILLS: 0
PALPITATIONS: 0
COUGH: 0
NAUSEA: 0
EYE PAIN: 0
JOINT SWELLING: 0
FEVER: 0
HEARTBURN: 0
DYSURIA: 0
MYALGIAS: 0
HEMATOCHEZIA: 0

## 2022-07-14 ASSESSMENT — LIFESTYLE VARIABLES
AUDIT-C TOTAL SCORE: 2
HOW OFTEN DO YOU HAVE SIX OR MORE DRINKS ON ONE OCCASION: NEVER
HOW OFTEN DO YOU HAVE A DRINK CONTAINING ALCOHOL: 2-4 TIMES A MONTH
SKIP TO QUESTIONS 9-10: 1
HOW MANY STANDARD DRINKS CONTAINING ALCOHOL DO YOU HAVE ON A TYPICAL DAY: 1 OR 2

## 2022-07-14 ASSESSMENT — SOCIAL DETERMINANTS OF HEALTH (SDOH)
DO YOU BELONG TO ANY CLUBS OR ORGANIZATIONS SUCH AS CHURCH GROUPS UNIONS, FRATERNAL OR ATHLETIC GROUPS, OR SCHOOL GROUPS?: PATIENT DECLINED
IN A TYPICAL WEEK, HOW MANY TIMES DO YOU TALK ON THE PHONE WITH FAMILY, FRIENDS, OR NEIGHBORS?: MORE THAN THREE TIMES A WEEK
HOW OFTEN DO YOU GET TOGETHER WITH FRIENDS OR RELATIVES?: MORE THAN THREE TIMES A WEEK
HOW OFTEN DO YOU ATTEND CHURCH OR RELIGIOUS SERVICES?: PATIENT DECLINED

## 2022-07-14 NOTE — PROGRESS NOTES
SUBJECTIVE:   CC: Ana Jackson is an 37 year old woman who presents for preventive health visit.       Patient has been advised of split billing requirements and indicates understanding: Yes  Healthy Habits:     Getting at least 3 servings of Calcium per day:  Yes    Bi-annual eye exam:  NO    Dental care twice a year:  Yes    Sleep apnea or symptoms of sleep apnea:  None    Diet:  Regular (no restrictions)    Frequency of exercise:  2-3 days/week    Duration of exercise:  30-45 minutes    Taking medications regularly:  Yes    Medication side effects:  None    PHQ-2 Total Score: 0    Additional concerns today:  No    She notes that 6 weeks ago she developed vaginal discharge and itchiness.  She took a 7-day course of Monistat after which the discharge resolved.  However, mild itchiness has persisted.  She does not have any dysuria or fever.    She takes regular birth control.  No other medications.  No allergies.  Family history notable for colon cancer in her mother and her mother's 2 sisters.  She consumes 0-1 serving of alcohol per week with no tobacco or drugs.  Lives at home with her  and 3 children.  Works as an  in finance.  Sleeps from 11-6 and feels well rested in the day.  Cardio exercise for 30 minutes 3 days a week.  Sometimes will skip lunch but does consume fruits and vegetables with her dinner.    Today's PHQ-2 Score:   PHQ-2 ( 1999 Pfizer) 7/14/2022   Q1: Little interest or pleasure in doing things 0   Q2: Feeling down, depressed or hopeless 0   PHQ-2 Score 0   PHQ-2 Total Score (12-17 Years)- Positive if 3 or more points; Administer PHQ-A if positive -   Q1: Little interest or pleasure in doing things Not at all   Q2: Feeling down, depressed or hopeless Not at all   PHQ-2 Score 0       Abuse: Current or Past (Physical, Sexual or Emotional) - No  Do you feel safe in your environment? Yes      Social History     Tobacco Use     Smoking status: Former Smoker      Packs/day: 0.50     Years: 2.00     Pack years: 1.00     Types: Cigarettes     Quit date: 2005     Years since quittin.7     Smokeless tobacco: Never Used     Tobacco comment: NO 2nd hand smoke at work   Substance Use Topics     Alcohol use: Not Currently     Alcohol/week: 0.0 standard drinks     Comment: 1-2 per week      If you drink alcohol do you typically have >3 drinks per day or >7 drinks per week? No    Alcohol Use 2022   Prescreen: >3 drinks/day or >7 drinks/week? No   Prescreen: >3 drinks/day or >7 drinks/week? -   No flowsheet data found.    Reviewed orders with patient.  Reviewed health maintenance and updated orders accordingly - Yes    FHS-7:   Breast CA Risk Assessment (FHS-7) 2021   Did any of your first-degree relatives have breast or ovarian cancer? No No   Did any of your relatives have bilateral breast cancer? No No   Did any man in your family have breast cancer? No No   Did any woman in your family have breast and ovarian cancer? No No   Did any woman in your family have breast cancer before age 50 y? No No   Do you have 2 or more relatives with breast and/or ovarian cancer? No No   Do you have 2 or more relatives with breast and/or bowel cancer? No Yes     Patient under 40 years of age: Routine Mammogram Screening not recommended.   Pertinent mammograms are reviewed under the imaging tab.    History of abnormal Pap smear: NO - age 30-65 PAP every 5 years with negative HPV co-testing recommended  PAP / HPV Latest Ref Rng & Units 2018 2016 10/26/2015   PAP - - - -   PAP (Historical) - NIL NIL NIL   HPV16 NEG:Negative Negative Negative Negative   HPV18 NEG:Negative Negative Negative Negative   HRHPV NEG:Negative Negative Negative Negative     Reviewed and updated as needed this visit by clinical staff   Tobacco  Allergies                 Reviewed and updated as needed this visit by Provider                     Review of Systems   Constitutional: Negative  "for chills and fever.   HENT: Negative for congestion, ear pain, hearing loss and sore throat.    Eyes: Negative for pain and visual disturbance.   Respiratory: Negative for cough and shortness of breath.    Cardiovascular: Negative for chest pain, palpitations and peripheral edema.   Gastrointestinal: Negative for abdominal pain, constipation, diarrhea, heartburn, hematochezia and nausea.   Breasts:  Negative for tenderness, breast mass and discharge.   Genitourinary: Negative for dysuria, frequency, genital sores, hematuria, pelvic pain, urgency, vaginal bleeding and vaginal discharge.   Musculoskeletal: Negative for arthralgias, joint swelling and myalgias.   Skin: Negative for rash.   Neurological: Negative for dizziness, weakness, headaches and paresthesias.   Psychiatric/Behavioral: Negative for mood changes. The patient is not nervous/anxious.      OBJECTIVE:   /74 (BP Location: Right arm, Patient Position: Chair, Cuff Size: Adult Regular)   Pulse 80   Temp 98.3  F (36.8  C) (Tympanic)   Resp 16   Ht 1.651 m (5' 5\")   Wt 62.8 kg (138 lb 8 oz)   LMP 07/07/2022   SpO2 (!) 10%   BMI 23.05 kg/m    Physical Exam  GENERAL: healthy, alert and no distress  EYES: Eyes grossly normal to inspection, PERRL and conjunctivae and sclerae normal  HENT: ear canals and TM's normal, nose and mouth without ulcers or lesions  NECK: mild lymphadenopathy, no asymmetry, masses, or scars and thyroid normal to palpation  RESP: lungs clear to auscultation - no rales, rhonchi or wheezes  CV: regular rate and rhythm, normal S1 S2, no S3 or S4, no murmur, click or rub, no peripheral edema and peripheral pulses strong  ABDOMEN: soft, nontender, no hepatosplenomegaly, no masses and bowel sounds normal  MS: no gross musculoskeletal defects noted, no edema  SKIN: no suspicious lesions or rashes  NEURO: Normal strength and tone, mentation intact and speech normal  PSYCH: mentation appears normal, affect " "normal/bright    ASSESSMENT/PLAN:   Ana was seen today for physical and imm/inj.    Diagnoses and all orders for this visit:    Vaginal discharge  The patient may have had incomplete resolution of a vaginal yeast infection.  We will further assess with her wet prep.  -     Wet prep - Clinic Collect    Family history of colon cancer  Given the colon cancer in her mother and both of her mother's sisters, it would be reasonable to pursue colonoscopy at this time.  -     Colonscopy Screening  Referral; Future    Routine general medical examination at a health care facility  -     COVID-19,PF,PFIZER (12+ Yrs GRAY LABEL)      COUNSELING:  Reviewed preventive health counseling, as reflected in patient instructions    Estimated body mass index is 23.05 kg/m  as calculated from the following:    Height as of this encounter: 1.651 m (5' 5\").    Weight as of this encounter: 62.8 kg (138 lb 8 oz).    She reports that she quit smoking about 16 years ago. Her smoking use included cigarettes. She has a 1.00 pack-year smoking history. She has never used smokeless tobacco.      Counseling Resources:  ATP IV Guidelines  Pooled Cohorts Equation Calculator  Breast Cancer Risk Calculator  BRCA-Related Cancer Risk Assessment: FHS-7 Tool  FRAX Risk Assessment  ICSI Preventive Guidelines  Dietary Guidelines for Americans, 2010  [employer]'s MyPlate  ASA Prophylaxis  Lung CA Screening    Salbador Lopez MD  [facility]  "

## 2022-07-14 NOTE — PATIENT INSTRUCTIONS
You should get a call to schedule the colonoscopy at [facility] in [place].      Preventive Health Recommendations  Female Ages 26 - 39  Yearly exam:   See your health care provider every year in order to  Review health changes.   Discuss preventive care.    Review your medicines if you your doctor has prescribed any.    Until age 30: Get a Pap test every three years (more often if you have had an abnormal result).    After age 30: Talk to your doctor about whether you should have a Pap test every 3 years or have a Pap test with HPV screening every 5 years.   You do not need a Pap test if your uterus was removed (hysterectomy) and you have not had cancer.  You should be tested each year for STDs (sexually transmitted diseases), if you're at risk.   Talk to your provider about how often to have your cholesterol checked.  If you are at risk for diabetes, you should have a diabetes test (fasting glucose).  Shots: Get a flu shot each year. Get a tetanus shot every 10 years.   Nutrition:   Eat at least 5 servings of fruits and vegetables each day.  Eat whole-grain bread, whole-wheat pasta and brown rice instead of white grains and rice.  Get adequate Calcium and Vitamin D.     Lifestyle  Exercise at least 150 minutes a week (30 minutes a day, 5 days of the week). This will help you control your weight and prevent disease.  Limit alcohol to one drink per day.  No smoking.   Wear sunscreen to prevent skin cancer.  See your dentist every six months for an exam and cleaning.

## 2022-07-26 ENCOUNTER — NURSE TRIAGE (OUTPATIENT)
Dept: NURSING | Facility: CLINIC | Age: 37
End: 2022-07-26

## 2022-07-26 NOTE — TELEPHONE ENCOUNTER
Patient is calling and was in a couple weeks ago and treated for a bacterial infection and finished antibiotic.  Patient was seen on 7/14/2022 by Salbador Harmon and was prescribed Flagyl.  Today is having itching and irritation  in vaginal area and is having blood in her bowel movements.  Irritation in rectal area as well and feels she has a hemorrhoid.  Irritation is all over and patient is requesting a new appointment.  Mother had colon cancer and patient wants to be tested also for colon cancer.    Reason for Disposition    MODERATE-SEVERE itching (i.e., interferes with school, work, or sleep)    Additional Information    Negative: Sounds like a life-threatening emergency to the triager    Negative: Patient sounds very sick or weak to the triager    Negative: [1] SEVERE pain AND [2] not improved 2 hours after pain medicine    Negative: [1] Genital area looks infected (e.g., draining sore, spreading redness) AND [2] fever    Negative: [1] Something is hanging out of the vagina AND [2] can't easily be pushed back inside    Protocols used: VAGINAL SYMPTOMS-A-AH

## 2022-08-02 ENCOUNTER — MYC MEDICAL ADVICE (OUTPATIENT)
Dept: PEDIATRICS | Facility: CLINIC | Age: 37
End: 2022-08-02

## 2022-08-02 ENCOUNTER — OFFICE VISIT (OUTPATIENT)
Dept: PEDIATRICS | Facility: CLINIC | Age: 37
End: 2022-08-02
Payer: COMMERCIAL

## 2022-08-02 VITALS
OXYGEN SATURATION: 99 % | RESPIRATION RATE: 16 BRPM | DIASTOLIC BLOOD PRESSURE: 66 MMHG | HEART RATE: 89 BPM | WEIGHT: 127.8 LBS | TEMPERATURE: 98.4 F | SYSTOLIC BLOOD PRESSURE: 116 MMHG | BODY MASS INDEX: 21.29 KG/M2 | HEIGHT: 65 IN

## 2022-08-02 DIAGNOSIS — L29.0 RECTAL ITCHING: ICD-10-CM

## 2022-08-02 DIAGNOSIS — Z11.3 ROUTINE SCREENING FOR STI (SEXUALLY TRANSMITTED INFECTION): ICD-10-CM

## 2022-08-02 DIAGNOSIS — N89.8 VAGINAL ITCHING: Primary | ICD-10-CM

## 2022-08-02 LAB
CLUE CELLS: ABNORMAL
TRICHOMONAS, WET PREP: ABNORMAL
WBC'S/HIGH POWER FIELD, WET PREP: ABNORMAL
YEAST, WET PREP: ABNORMAL

## 2022-08-02 PROCEDURE — 99213 OFFICE O/P EST LOW 20 MIN: CPT | Performed by: NURSE PRACTITIONER

## 2022-08-02 PROCEDURE — 87591 N.GONORRHOEAE DNA AMP PROB: CPT | Performed by: NURSE PRACTITIONER

## 2022-08-02 PROCEDURE — 87210 SMEAR WET MOUNT SALINE/INK: CPT | Performed by: NURSE PRACTITIONER

## 2022-08-02 PROCEDURE — 87491 CHLMYD TRACH DNA AMP PROBE: CPT | Performed by: NURSE PRACTITIONER

## 2022-08-02 ASSESSMENT — PAIN SCALES - GENERAL: PAINLEVEL: NO PAIN (0)

## 2022-08-02 NOTE — PATIENT INSTRUCTIONS
[phone]  call for colonoscopy    For your bottom  -over the counter preparation H  -soak in bath  -avoid constipation: I recommend daily metamucil and fiber/fluids in diet. Regular activity.

## 2022-08-02 NOTE — PROGRESS NOTES
Assessment & Plan     Vaginal itching  Normal wet prep. Pt notes chronic intermittent problem, if persisting to see OBGYN.  - Wet prep - lab collect; Future  - Wet prep - lab collect  - CHLAMYDIA TRACHOMATIS PCR; Future  - NEISSERIA GONORRHOEA PCR; Future  - Ob/Gyn Referral; Future  - CHLAMYDIA TRACHOMATIS PCR  - NEISSERIA GONORRHOEA PCR    Routine screening for STI (sexually transmitted infection)  - CHLAMYDIA TRACHOMATIS PCR; Future  - NEISSERIA GONORRHOEA PCR; Future  - CHLAMYDIA TRACHOMATIS PCR  - NEISSERIA GONORRHOEA PCR    Rectal itching  Possible internal hemorrhoid. Already has referral for colonoscopy.  Rec avoid constipation, sitz baths, and preparation H for itch.       Patient Instructions   [phone]  call for colonoscopy    For your bottom  -over the counter preparation H  -soak in bath  -avoid constipation: I recommend daily metamucil and fiber/fluids in diet. Regular activity.      No follow-ups on file.    Gabriela Menezes NP  [facility] SWATI Perez is a 37 year old, presenting for the following health issues:  Rectal Problem      History of Present Illness       Reason for visit:  Vaginal/rectal itching. Sore in rect area.  Symptom onset:  3-4 weeks ago  Symptom intensity:  Moderate  Symptom progression:  Worsening  Had these symptoms before:  No  What makes it worse:  No  What makes it better:  No    She eats 4 or more servings of fruits and vegetables daily.She consumes 1 sweetened beverage(s) daily.She exercises with enough effort to increase her heart rate 30 to 60 minutes per day.  She exercises with enough effort to increase her heart rate 3 or less days per week.   She is taking medications regularly.     7/14- BV and flagyl  Discharge and irritation  No pain with intercourse  Denies any chance of STI    Also some intermittent rectal itch  Occasional blood when wiping  Has referral for colonoscopy  Occ constipation      Review of Systems  "  Constitutional, HEENT, cardiovascular, pulmonary, gi and gu systems are negative, except as otherwise noted.      Objective    /66   Pulse 89   Temp 98.4  F (36.9  C) (Tympanic)   Resp 16   Ht 1.651 m (5' 5\")   Wt 58 kg (127 lb 12.8 oz)   LMP 07/07/2022   SpO2 99%   BMI 21.27 kg/m    Body mass index is 21.27 kg/m .  Physical Exam   GENERAL: healthy, alert and no distress   (female): normal female external genitalia, normal urethral meatus, vaginal mucosa, normal cervix/adnexa/uterus without masses or discharge  RECTAL (female): normal sphincter tone, no rectal masses    Results for orders placed or performed in visit on 08/02/22 (from the past 24 hour(s))   Wet prep - lab collect    Specimen: Vagina; Swab   Result Value Ref Range    Trichomonas Absent Absent    Yeast Absent Absent    Clue Cells Absent Absent    WBCs/high power field 2+ (A) None                   .  ..  "

## 2022-08-03 LAB
C TRACH DNA SPEC QL NAA+PROBE: NEGATIVE
N GONORRHOEA DNA SPEC QL NAA+PROBE: NEGATIVE

## 2022-09-23 ENCOUNTER — TELEPHONE (OUTPATIENT)
Dept: GASTROENTEROLOGY | Facility: CLINIC | Age: 37
End: 2022-09-23

## 2022-09-23 NOTE — CONFIDENTIAL NOTE
Screening Questions  BLUE  KIND OF PREP RED  LOCATION [review exclusion criteria] GREEN  SEDATION TYPE        Yes  Are you active on mychart?       Croke Ordering/Referring Provider?         [facility]nersWhat type of coverage do you have?      No  Have you had a positive covid test in the last 90 days?     1. 21.1 BMI  [BMI 40+ - review exclusion criteria]    2. YeS   Are you able to give consent for your medical care? [IF NO,RN REVIEW]        3. NO Are you taking any prescription pain medications on a routine schedule?        3a.  EXTENDED PREP What kind of prescription?   4. No  Do you have any chemical dependencies such as alcohol, street drugs, or methadone?    5. No  Do you have any history of post-traumatic stress syndrome, severe anxiety or history of psychosis?      **If yes 3- 5 , please schedule with MAC sedation.**          IF YES TO ANY 6 - 10 - HOSPITAL SETTING ONLY.     6.   No  Do you need assistance transferring?     7.   No  Have you had a heart or lung transplant?    8.   No  Are you currently on dialysis?   9.   No  Do you use daily home oxygen?   10. No  Do you take nitroglycerin?   10a.  If yes, how often?     11. [FEMALES]  No  Are you currently pregnant?    11a.  If yes, how many weeks? [ Greater than 12 weeks, OR NEEDED]    12. No  Do you have Pulmonary Hypertension? *NEED PAC APPT AT [facility]*     13. No  [review exclusion criteria]  Do you have any implantable devices in your body (pacemaker, defib, LVAD)?    14. No  In the past 6 months, have you had any heart related issues including cardiomyopathy or heart attack?     14a.  If yes, did it require cardiac stenting if so when?     15. No  Have you had a stroke or Transient ischemic attack (TIA - aka  mini stroke ) within 6 months?      16. No  Do you have mod to severe Obstructive Sleep Apnea?  [Hospital only - Ok at [facility]]    17. No  Do you have SEVERE AND UNCONTROLLED asthma? *NEED PAC APPT AT [facility]*     18. No  Are you currently  "taking any blood thinners?     18a. If yes, inform patient to \"follow up w/ ordering provider for bridging instructions.\"    19. No  Do you take the medication Phentermine?    19a. If yes, \"Hold for 7 days before procedure.  Please consult your prescribing provider if you have questions about holding this medication.\"     20. No   Do you have chronic kidney disease?      21. No   Do you have a diagnosis of diabetes?     22. NO   On a regular basis do you go 3-5 days between bowel movements?     23.  Preferred LOCAL Pharmacy for Pre Prescription    [ LIST ONLY ONE PHARMACY]     [facility] [address] [place], [place] - [address]        - CLOSING REMINDERS -    Informed patient they will need an adult    Cannot take any type of public or medical transportation alone    Conscious Sedation- Needs  for 6 hours after the procedure       MAC/General-Needs  for 24 hours after procedure    Pre-Procedure Covid test to be completed [[facility] PCR Testing Required]    Confirmed Nurse will call to complete assessment       - SCHEDULING DETAILS -     Ifeoma  Surgeon    11/2/022  Date of Procedure  Lower Endoscopy [Colonoscopy]  Type of Procedure Scheduled   [facility] PREP-If you answer yes to questions #8, #20, #21Which Colonoscopy Prep was Sent?     Moderate  Sedation Type     No  PAC / Pre-op Required         Additional comments:  no           "

## 2022-10-14 RX ORDER — BISACODYL 5 MG
TABLET, DELAYED RELEASE (ENTERIC COATED) ORAL
Qty: 4 TABLET | Refills: 0 | Status: SHIPPED | OUTPATIENT
Start: 2022-10-14

## 2022-10-27 ENCOUNTER — TELEPHONE (OUTPATIENT)
Dept: GASTROENTEROLOGY | Facility: CLINIC | Age: 37
End: 2022-10-27

## 2022-10-27 NOTE — TELEPHONE ENCOUNTER
Caller: Ana Jackson    Procedure: Colonoscopy    Date, Location, and Surgeon of Procedure Cancelled: 11/2/22, Ifeoma    Ordering Provider: Tracy Jung MD     Reason for cancel (please be detailed, any staff messages or encounters to note?): schedule conflict        Rescheduled: Y     If rescheduled:    Date: 01/04/23   Location:    Prep Resent:  No (changes to prep?)   Covid Test Rescheduled: no   Note any change or update to original order/sedation:

## 2022-10-29 ENCOUNTER — HEALTH MAINTENANCE LETTER (OUTPATIENT)
Age: 37
End: 2022-10-29

## 2022-12-05 ENCOUNTER — E-VISIT (OUTPATIENT)
Dept: FAMILY MEDICINE | Facility: CLINIC | Age: 37
End: 2022-12-05
Payer: COMMERCIAL

## 2022-12-05 DIAGNOSIS — J01.90 ACUTE SINUSITIS, RECURRENCE NOT SPECIFIED, UNSPECIFIED LOCATION: Primary | ICD-10-CM

## 2022-12-05 PROCEDURE — 99421 OL DIG E/M SVC 5-10 MIN: CPT | Performed by: FAMILY MEDICINE

## 2022-12-05 NOTE — PATIENT INSTRUCTIONS
Dear Ana Jackson    After reviewing your responses, I've been able to diagnose you withwhat soundsl rosaura a sinus infection. These are most ofen viral but if not improving/worseing after a week then it is possible it is bacterial and we can use an antibiotic-augmentin- I sent this in. I can't tell if you have an ear infeciton wihtout looking at it. The augmentin works well for most ear infections.     Based on your responses and diagnosis, I have prescribed No orders of the defined types were placed in this encounter.   to treat your symptoms. I have sent this to your pharmacy.?     It is also important to stay well hydrated, get lots of rest and take over-the-counter decongestants,?tylenol?or ibuprofen if you?are able to?take those medications per your primary care provider to help relieve discomfort.?     It is important that you take?all of?your prescribed medication even if your symptoms are improving after a few doses.? Taking?all of?your medicine helps prevent the symptoms from returning.?     If your symptoms worsen, you develop severe headache, vomiting, high fever (>102), or are not improving in 7 days, please contact your primary care provider for an appointment or visit any of our convenient Walk-in Care or Urgent Care Centers to be seen which can be found on our website?here.?     Thanks again for choosing?us?as your health care partner,?   ?  Mino Morales MD?

## 2022-12-26 ENCOUNTER — OFFICE VISIT (OUTPATIENT)
Dept: OBGYN | Facility: CLINIC | Age: 37
End: 2022-12-26
Payer: COMMERCIAL

## 2022-12-26 VITALS
HEART RATE: 114 BPM | WEIGHT: 134.7 LBS | BODY MASS INDEX: 22.42 KG/M2 | DIASTOLIC BLOOD PRESSURE: 77 MMHG | SYSTOLIC BLOOD PRESSURE: 117 MMHG

## 2022-12-26 DIAGNOSIS — N89.8 VAGINAL DISCHARGE: Primary | ICD-10-CM

## 2022-12-26 DIAGNOSIS — N76.1 DESQUAMATIVE INFLAMMATORY VAGINITIS: Primary | ICD-10-CM

## 2022-12-26 DIAGNOSIS — G43.829 MENSTRUAL MIGRAINE WITHOUT STATUS MIGRAINOSUS, NOT INTRACTABLE: ICD-10-CM

## 2022-12-26 DIAGNOSIS — Z30.41 ORAL CONTRACEPTIVE PILL SURVEILLANCE: ICD-10-CM

## 2022-12-26 PROCEDURE — 87210 SMEAR WET MOUNT SALINE/INK: CPT | Performed by: OBSTETRICS & GYNECOLOGY

## 2022-12-26 PROCEDURE — 99204 OFFICE O/P NEW MOD 45 MIN: CPT | Performed by: OBSTETRICS & GYNECOLOGY

## 2022-12-26 RX ORDER — CLINDAMYCIN PHOSPHATE 20 MG/G
1 CREAM VAGINAL AT BEDTIME
Qty: 40 G | Refills: 1 | Status: ON HOLD | OUTPATIENT
Start: 2022-12-26 | End: 2023-01-04

## 2022-12-26 RX ORDER — DESOGESTREL AND ETHINYL ESTRADIOL 0.15-0.03
1 KIT ORAL DAILY
Qty: 84 TABLET | Refills: 4 | Status: SHIPPED | OUTPATIENT
Start: 2022-12-26

## 2022-12-26 NOTE — PATIENT INSTRUCTIONS
If you have any questions regarding your visit, Please contact your care team.    [facility]: [phone]      Women s Health CLINIC HOURS TELEPHONE NUMBER   Maylin Hernandez DO.    NIKKY Zee-Surgery Scheduler  Eloisa - Surgery Scheduler    YAZ Fernando, YAZ Mcdaniel RN     Monday, Thursday  [place]  7am-3pm    Tuesday, Wednesday  [place]  7am-3pm    E/O Friday &   [place]    Typical Surgery Days: Thursday or Friday   [facility]  [address] [place] [address] [phone] Phone  [phone] Fax    [facility]  [address] [place] [address] [phone] Phone    Imaging [phone] Phone    [facility] Labor and Delivery:  [phone] Phone     **Surgeries** Our Surgery Schedulers will contact you to schedule. If you do not receive a call within 3 business days, please call [phone].    Urgent Care locations:  [facility] Saturday and    9 am - 5 pm    Monday-Friday   12 pm - 8 pm  Saturday and    9 am - 5 pm   [phone]       If you need a medication refill, please contact your pharmacy. Please allow 3 business days for your refill to be completed.  As always, Thank you for trusting us with your healthcare needs!       Healthy vulvar-vaginal hygiene practices    Avoid  Substitute    Pantyhose  Stockings with a garter belt    Thigh-high or knee-high stockings   Synthetic underwear Cotton underwear or no underwear   Jeans and other tight pants Loose pants, skirts, dresses   Swimsuits, leotards, thongs, lycra garments Loose-fitting cotton garments   Panty liners Tampons or cotton pads   Scented soaps or shampoos Fragrance-free pH neutral soap (eg, Neutrogena fragrance free, pure olive oil soap, Cetaphil gentle skin cleanser or equivalent ingredients)    Bubble bath Tub baths in the morning and at night without additives and at a comfortable temperature   Scented detergents Unscented detergents   Washcloths Use fingertips for washing; pat dry, don't rub dry   Baby wipes or flushable wipes Rinse with water using sports water bottle or perineal irrigation bottle    Feminine sprays, douches, powders These are not necessary products and can be omitted from personal practices   Dyed toilet articles Toilet articles without dyes   Hair dryers to dry vulva skin without contact Dry vulva by gentle patting        Possible causes-  Yeast infection  BV infection  DIV (desquamative inflammatory vaginitis)

## 2022-12-26 NOTE — PROGRESS NOTES
SUBJECTIVE:       HPI: Ana Jackson is a 37 year old  who presents today for vaginal swelling.     Recently tested for BV and yeast infections and treated. C/o tingling in vagina and red bumps. Mild irritation. Dull pain b/l lower abdomen for the last few days that comes/goes and is not debiltating.  This all started a few months ago. Was initially treated for BV. Some symptoms went away, some persisted. Tested again and negative.  (1 positive wet pre for clue cells).  Recently treated Over the counter with monistat with mild improvement in symptoms.  Has tried baking soda baths.  Uses dye-free products.    Vulvar and vaginal hygeine practices:  -Washes with water. May use ceravae wash if treated.  -No use feminine sprays/cleaning products  -+shaves  -No douching  -No incontinence  -Sexually active with one male partners. Does not use toys, lubricant, condoms    Medical history significant for - none       Gyn Hx: Patient's last menstrual period was 2022 (approximate).     Last pap was 2018 nil neg hpv   STI history - denies  Using oral contraceptives/vasectomy for contraception.   Getting menstrual migraines, taking ocps so getting periods.           reports that she quit smoking about 17 years ago. Her smoking use included cigarettes. She has a 1.00 pack-year smoking history. She has never used smokeless tobacco.      Today's PHQ-2 Score:   PHQ-2 (  [employer]) 2022   Q1: Little interest or pleasure in doing things 0   Q2: Feeling down, depressed or hopeless 0   PHQ-2 Score 0   PHQ-2 Total Score (12-17 Years)- Positive if 3 or more points; Administer PHQ-A if positive -   Q1: Little interest or pleasure in doing things Not at all   Q2: Feeling down, depressed or hopeless Not at all   PHQ-2 Score 0     Today's PHQ-9 Score: No flowsheet data found.  Today's BEATRIS-7 Score: No flowsheet data found.    Problem list and histories reviewed & adjusted, as indicated.  Additional history: as  documented.    Patient Active Problem List   Diagnosis     Migraine without aura     CARDIOVASCULAR SCREENING; LDL GOAL LESS THAN 160     Family history of colon cancer     Supervision of normal pregnancy     Indication for care in labor or delivery     Vaginal delivery     Past Surgical History:   Procedure Laterality Date     HC TOOTH EXTRACTION W/FORCEP       LYMPH NODE BIOPSY  2018      Social History     Tobacco Use     Smoking status: Former     Packs/day: 0.50     Years: 2.00     Pack years: 1.00     Types: Cigarettes     Quit date: 2005     Years since quittin.1     Smokeless tobacco: Never     Tobacco comments:     NO 2nd hand smoke at work   Substance Use Topics     Alcohol use: Not Currently     Alcohol/week: 0.0 standard drinks     Comment: 1-2 per week       Problem (# of Occurrences) Relation (Name,Age of Onset)    Asthma (2) Mother, Brother    Cancer (1) Maternal Grandmother: skin    Cancer - colorectal (1) Maternal Aunt (58)    Other Cancer (1) Maternal Aunt: rectal    Colon Cancer (1) Mother       Negative family history of: Anesthesia Reaction, Blood Disease, Neurologic Disorder, Breast Cancer, Heart Disease            bisacodyl (DULCOLAX) 5 MG EC tablet, Take 2 tablets at 3 pm the day before your procedure. If your procedure is before 11 am, take 2 additional tablets at 11 pm. If your procedure is after 11 am, take 2 additional tablets at 6 am. For additional instructions refer to your colonoscopy prep instructions. (Patient not taking: Reported on 2022)  polyethylene glycol (GOLYTELY) 236 g suspension, The night before the exam at 6 pm drink an 8-ounce glass every 15 minutes until the jug is half empty. If you arrive before 11 AM: Drink the other half of the Golytely jug at 11 PM night before procedure. If you arrive after 11 AM: Drink the other half of the Golytely jug at 6 AM day of procedure. For additional instructions refer to your colonoscopy prep instructions. (Patient  not taking: Reported on 12/26/2022)    No current facility-administered medications on file prior to visit.    No Known Allergies    ROS:  10 Point review of systems negative other noted above in HPI    OBJECTIVE:     /77 (BP Location: Right arm, Patient Position: Sitting, Cuff Size: Adult Regular)   Pulse 114   Wt 61.1 kg (134 lb 11.2 oz)   LMP 12/05/2022 (Approximate)   Breastfeeding No   BMI 22.42 kg/m    Body mass index is 22.42 kg/m .      Gen: Alert, oriented, appropriately interactive, NAD  Resp: no audible wheeze, cough, or visible cyanosis.  No visible retractions or increased work of breathing.  Able to speak fully in complete sentences.  Abdomen: soft, non tender, non distended  External genitalia: no lesions; normal appearing external genitalia, bartholins glands, urethra, skenes glands  Vagina: no masses or lesions or discharge, normally rugated.  Cervix: no masses or lesions or discharge   Bimanual exam:   Nontender pelvic floor muscles however soreness with exam noted on vaginal walls.  Urethra: nontender   Bladder: nontender and without massess, well supported   Uterus: midline, anteverted, small, mobile  no masses, non-tender  Adnexa: no masses or tenderness appreciated   No cervical motion tenderness  Psych: mentation appears normal, affect normal/bright, judgement and insight intact, normal speech and appearance well-groomed      In-Clinic Test Results:  Results for orders placed or performed in visit on 12/26/22 (from the past 24 hour(s))   Wet prep - Clinic Collect    Specimen: Vagina; Swab   Result Value Ref Range    Trichomonas Absent Absent    Yeast Absent Absent    Clue Cells Absent Absent    WBCs/high power field 1+ (A) None       Component      Latest Ref Rng & Units 7/14/2022 8/2/2022   Trichomonas      Absent Absent Absent   Yeast      Absent Absent Absent   Clue cells      Absent Present (A) Absent   WBCs/high power field      None 2+ (A) 2+ (A)   Chlamydia Trachomatis PCR     "  Negative  Negative   N Gonorrhea PCR      Negative  Negative       ASSESSMENT/PLAN:                                                      Ana Jackson is a 37 year old  who presents today for possible vaginal infection, vaginal swelling/tingling      ICD-10-CM    1. Vaginal discharge  N89.8 Wet prep - Clinic Collect      2. Oral contraceptive pill surveillance  Z30.41 desogestrel-ethinyl estradiol (APRI) 0.15-30 MG-MCG tablet      3. Menstrual migraine without status migrainosus, not intractable  G43.829           Wet prep collected. Potential etiologies for symptoms discussed, including recurrent BV or yeast infection, DIV, less likely malignancy.     Discussed vulvar and vaginal hygiene practices, and list of practices given to patient. Advised to avoid Over the counter \"feminine\" soaps/sprays. Instead, recommend Cetaphil cleanser or other gentle ph-neutral cleanser. No need to use any products inside of the vagina.  Recommend treatment based on wet prep results. (Recent STI screen negative). If wet prep positive, treat as indicated. If negative, treat for possible DIV given symptoms. We also discussed utility of boric acid or biopsy with persistent symptoms in the future.  Printed information provided.   All questions answered. Patient in agreement with plan.    Menstrual migraines on cyclic OCPs. Counseled regarding option for continuous use of OCPs, which she is interested in. Discussed proper use, what to do if BTB. All questions answered. New Rx sent.      Maylin Hernandez,   [facility]   "

## 2023-01-04 ENCOUNTER — HOSPITAL ENCOUNTER (OUTPATIENT)
Facility: CLINIC | Age: 38
Discharge: HOME | End: 2023-01-04
Attending: INTERNAL MEDICINE | Admitting: INTERNAL MEDICINE
Payer: COMMERCIAL

## 2023-01-04 VITALS
DIASTOLIC BLOOD PRESSURE: 75 MMHG | SYSTOLIC BLOOD PRESSURE: 120 MMHG | RESPIRATION RATE: 16 BRPM | WEIGHT: 134.7 LBS | HEART RATE: 73 BPM | BODY MASS INDEX: 22.44 KG/M2 | OXYGEN SATURATION: 100 % | TEMPERATURE: 98 F | HEIGHT: 65 IN

## 2023-01-04 DIAGNOSIS — Z12.11 COLON CANCER SCREENING: Primary | ICD-10-CM

## 2023-01-04 LAB — COLONOSCOPY: NORMAL

## 2023-01-04 PROCEDURE — 258N000003 HC RX IP 258 OP 636: Performed by: INTERNAL MEDICINE

## 2023-01-04 PROCEDURE — G0500 MOD SEDAT ENDO SERVICE >5YRS: HCPCS | Performed by: INTERNAL MEDICINE

## 2023-01-04 PROCEDURE — 45378 DIAGNOSTIC COLONOSCOPY: CPT | Performed by: INTERNAL MEDICINE

## 2023-01-04 PROCEDURE — G0105 COLORECTAL SCRN; HI RISK IND: HCPCS | Performed by: INTERNAL MEDICINE

## 2023-01-04 PROCEDURE — 99153 MOD SED SAME PHYS/QHP EA: CPT | Performed by: INTERNAL MEDICINE

## 2023-01-04 PROCEDURE — 250N000011 HC RX IP 250 OP 636: Performed by: INTERNAL MEDICINE

## 2023-01-04 RX ORDER — DIPHENHYDRAMINE HYDROCHLORIDE 50 MG/ML
25-50 INJECTION INTRAMUSCULAR; INTRAVENOUS
Status: DISCONTINUED | OUTPATIENT
Start: 2023-01-04 | End: 2023-01-04 | Stop reason: HOSPADM

## 2023-01-04 RX ORDER — NALOXONE HYDROCHLORIDE 0.4 MG/ML
0.4 INJECTION, SOLUTION INTRAMUSCULAR; INTRAVENOUS; SUBCUTANEOUS
Status: DISCONTINUED | OUTPATIENT
Start: 2023-01-04 | End: 2023-01-04 | Stop reason: HOSPADM

## 2023-01-04 RX ORDER — LIDOCAINE 40 MG/G
CREAM TOPICAL
Status: DISCONTINUED | OUTPATIENT
Start: 2023-01-04 | End: 2023-01-04 | Stop reason: HOSPADM

## 2023-01-04 RX ORDER — FLUMAZENIL 0.1 MG/ML
0.2 INJECTION, SOLUTION INTRAVENOUS
Status: DISCONTINUED | OUTPATIENT
Start: 2023-01-04 | End: 2023-01-04 | Stop reason: HOSPADM

## 2023-01-04 RX ORDER — NALOXONE HYDROCHLORIDE 0.4 MG/ML
0.2 INJECTION, SOLUTION INTRAMUSCULAR; INTRAVENOUS; SUBCUTANEOUS
Status: DISCONTINUED | OUTPATIENT
Start: 2023-01-04 | End: 2023-01-04 | Stop reason: HOSPADM

## 2023-01-04 RX ORDER — ONDANSETRON 2 MG/ML
4 INJECTION INTRAMUSCULAR; INTRAVENOUS
Status: COMPLETED | OUTPATIENT
Start: 2023-01-04 | End: 2023-01-04

## 2023-01-04 RX ORDER — ONDANSETRON 4 MG/1
4 TABLET, ORALLY DISINTEGRATING ORAL EVERY 6 HOURS PRN
Status: DISCONTINUED | OUTPATIENT
Start: 2023-01-04 | End: 2023-01-04 | Stop reason: HOSPADM

## 2023-01-04 RX ORDER — SIMETHICONE 40MG/0.6ML
133 SUSPENSION, DROPS(FINAL DOSAGE FORM)(ML) ORAL
Status: DISCONTINUED | OUTPATIENT
Start: 2023-01-04 | End: 2023-01-04 | Stop reason: HOSPADM

## 2023-01-04 RX ORDER — PROCHLORPERAZINE MALEATE 10 MG
10 TABLET ORAL EVERY 6 HOURS PRN
Status: DISCONTINUED | OUTPATIENT
Start: 2023-01-04 | End: 2023-01-04 | Stop reason: HOSPADM

## 2023-01-04 RX ORDER — ATROPINE SULFATE 0.1 MG/ML
1 INJECTION INTRAVENOUS
Status: DISCONTINUED | OUTPATIENT
Start: 2023-01-04 | End: 2023-01-04 | Stop reason: HOSPADM

## 2023-01-04 RX ORDER — ONDANSETRON 2 MG/ML
4 INJECTION INTRAMUSCULAR; INTRAVENOUS EVERY 6 HOURS PRN
Status: DISCONTINUED | OUTPATIENT
Start: 2023-01-04 | End: 2023-01-04 | Stop reason: HOSPADM

## 2023-01-04 RX ORDER — EPINEPHRINE 1 MG/ML
0.1 INJECTION, SOLUTION INTRAMUSCULAR; SUBCUTANEOUS
Status: DISCONTINUED | OUTPATIENT
Start: 2023-01-04 | End: 2023-01-04 | Stop reason: HOSPADM

## 2023-01-04 RX ORDER — FENTANYL CITRATE 50 UG/ML
50-100 INJECTION, SOLUTION INTRAMUSCULAR; INTRAVENOUS EVERY 5 MIN PRN
Status: DISCONTINUED | OUTPATIENT
Start: 2023-01-04 | End: 2023-01-04 | Stop reason: HOSPADM

## 2023-01-04 RX ADMIN — FENTANYL CITRATE 100 MCG: 50 INJECTION, SOLUTION INTRAMUSCULAR; INTRAVENOUS at 11:11

## 2023-01-04 RX ADMIN — FENTANYL CITRATE 50 MCG: 50 INJECTION, SOLUTION INTRAMUSCULAR; INTRAVENOUS at 11:23

## 2023-01-04 RX ADMIN — MIDAZOLAM 1 MG: 1 INJECTION INTRAMUSCULAR; INTRAVENOUS at 11:23

## 2023-01-04 RX ADMIN — MIDAZOLAM 2 MG: 1 INJECTION INTRAMUSCULAR; INTRAVENOUS at 11:11

## 2023-01-04 RX ADMIN — ONDANSETRON 4 MG: 2 INJECTION INTRAMUSCULAR; INTRAVENOUS at 11:09

## 2023-01-04 RX ADMIN — MIDAZOLAM 1 MG: 1 INJECTION INTRAMUSCULAR; INTRAVENOUS at 11:18

## 2023-01-04 RX ADMIN — SODIUM CHLORIDE 500 ML: 9 INJECTION, SOLUTION INTRAVENOUS at 11:08

## 2023-01-04 RX ADMIN — FENTANYL CITRATE 50 MCG: 50 INJECTION, SOLUTION INTRAMUSCULAR; INTRAVENOUS at 11:18

## 2023-01-04 ASSESSMENT — ACTIVITIES OF DAILY LIVING (ADL): ADLS_ACUITY_SCORE: 35

## 2023-01-04 NOTE — H&P
Pre-Endoscopy History and Physical     Ana Jackson MRN# [id number]   YOB: 1985 Age: 37 year old     Date of Procedure: 2023  Primary care provider: Chloé Hussein  Type of Endoscopy: Colonoscopy with possible biopsy, possible polypectomy  Reason for Procedure: family history of colon cancer  Type of Anesthesia Anticipated: Conscious Sedation    HPI:    Ana is a 37 year old female who will be undergoing the above procedure.      A history and physical has been performed. The patient's medications and allergies have been reviewed. The risks and benefits of the procedure and the sedation options and risks were discussed with the patient.  All questions were answered and informed consent was obtained.      She denies a personal or family history of anesthesia complications or bleeding disorders.     Patient Active Problem List   Diagnosis     Migraine without aura     CARDIOVASCULAR SCREENING; LDL GOAL LESS THAN 160     Family history of colon cancer     Supervision of normal pregnancy     Indication for care in labor or delivery     Vaginal delivery        Past Medical History:   Diagnosis Date     Closed fracture of unspecified part of forearm     (L)     Herpangina 89     Migraine, unspecified, without mention of intractable migraine without mention of status migrainosus         Past Surgical History:   Procedure Laterality Date     HC TOOTH EXTRACTION W/FORCEP       LYMPH NODE BIOPSY  2018       Social History     Tobacco Use     Smoking status: Former     Packs/day: 0.50     Years: 2.00     Pack years: 1.00     Types: Cigarettes     Quit date: 2005     Years since quittin.1     Smokeless tobacco: Never     Tobacco comments:     NO 2nd hand smoke at work   Substance Use Topics     Alcohol use: Yes     Comment: 1-2 per week        Family History   Problem Relation Age of Onset     Colon Cancer Mother      Asthma Mother      Cancer Maternal Grandmother          "skin     Asthma Brother      Colon Cancer Maternal Aunt      Cancer - colorectal Maternal Aunt 58     Other Cancer Maternal Aunt         rectal     Anesthesia Reaction No family hx of      Blood Disease No family hx of      Neurologic Disorder No family hx of      Breast Cancer No family hx of      Heart Disease No family hx of        Prior to Admission medications    Medication Sig Start Date End Date Taking? Authorizing Provider   bisacodyl (DULCOLAX) 5 MG EC tablet Take 2 tablets at 3 pm the day before your procedure. If your procedure is before 11 am, take 2 additional tablets at 11 pm. If your procedure is after 11 am, take 2 additional tablets at 6 am. For additional instructions refer to your colonoscopy prep instructions.  Patient not taking: Reported on 12/26/2022 10/14/22  Yes Errol Dia MD   polyethylene glycol (GOLYTELY) 236 g suspension The night before the exam at 6 pm drink an 8-ounce glass every 15 minutes until the jug is half empty. If you arrive before 11 AM: Drink the other half of the Golytely jug at 11 PM night before procedure. If you arrive after 11 AM: Drink the other half of the Golytely jug at 6 AM day of procedure. For additional instructions refer to your colonoscopy prep instructions.  Patient not taking: Reported on 12/26/2022 10/14/22  Yes Errol Dia MD   desogestrel-ethinyl estradiol (APRI) 0.15-30 MG-MCG tablet Take 1 tablet by mouth daily 12/26/22   Maylin Hernandez, DO       No Known Allergies     REVIEW OF SYSTEMS:   5 point ROS negative except as noted above in HPI, including Gen., Resp., CV, GI &  system review.    PHYSICAL EXAM:   Ht 1.651 m (5' 5\")   Wt 61.1 kg (134 lb 11.2 oz)   LMP 12/05/2022 (Approximate)   BMI 22.42 kg/m   Estimated body mass index is 22.42 kg/m  as calculated from the following:    Height as of this encounter: 1.651 m (5' 5\").    Weight as of this encounter: 61.1 kg (134 lb 11.2 oz).   GENERAL APPEARANCE: alert, and oriented  MENTAL " STATUS: alert  AIRWAY EXAM: Mallampatti Class I (visualization of the soft palate, fauces, uvula, anterior and posterior pillars)  RESP: lungs clear to auscultation - no rales, rhonchi or wheezes  CV: regular rates and rhythm  DIAGNOSTICS:    Not indicated    IMPRESSION   ASA Class 1 - Healthy patient, no medical problems    PLAN:   Plan for Colonoscopy with possible biopsy, possible polypectomy. We discussed the risks, benefits and alternatives and the patient wished to proceed.    The above has been forwarded to the consulting provider.      Signed Electronically by: Errol Dia MD  January 4, 2023

## 2023-06-14 ENCOUNTER — PATIENT OUTREACH (OUTPATIENT)
Dept: CARE COORDINATION | Facility: CLINIC | Age: 38
End: 2023-06-14
Payer: COMMERCIAL

## 2023-06-28 ENCOUNTER — PATIENT OUTREACH (OUTPATIENT)
Dept: CARE COORDINATION | Facility: CLINIC | Age: 38
End: 2023-06-28
Payer: COMMERCIAL

## 2023-09-03 ENCOUNTER — HEALTH MAINTENANCE LETTER (OUTPATIENT)
Age: 38
End: 2023-09-03

## 2023-09-07 DIAGNOSIS — Z30.41 ORAL CONTRACEPTIVE PILL SURVEILLANCE: ICD-10-CM

## 2023-09-07 RX ORDER — DESOGESTREL AND ETHINYL ESTRADIOL 0.15-0.03
1 KIT ORAL DAILY
Qty: 84 TABLET | Refills: 4 | Status: CANCELLED | OUTPATIENT
Start: 2023-09-07

## 2023-09-07 NOTE — TELEPHONE ENCOUNTER
"Requested Prescriptions   Pending Prescriptions Disp Refills    desogestrel-ethinyl estradiol (APRI) 0.15-30 MG-MCG tablet 84 tablet 4     Sig: Take 1 tablet by mouth daily       Contraceptives Protocol Passed - 9/7/2023  7:43 AM        Passed - Patient is not a current smoker if age is 35 or older        Passed - Recent (12 mo) or future (30 days) visit within the authorizing provider's specialty     Patient has had an office visit with the authorizing provider or a provider within the authorizing providers department within the previous 12 mos or has a future within next 30 days. See \"Patient Info\" tab in inbasket, or \"Choose Columns\" in Meds & Orders section of the refill encounter.              Passed - Medication is active on med list        Passed - No active pregnancy on record        Passed - No positive pregnancy test in past 12 months             Pt last seen 12/26/2022 for vaginal discharge concerns    Last prescribed 12/26/2022 for 84 tablets with 4 refills    Refills remain at patient's pharmacy. Refill not appropriate at this time, because there are additional refills remaining on current prescription    Violeta Castanon RN on 9/7/2023 at 8:27 AM    "

## 2024-10-09 ENCOUNTER — VIRTUAL VISIT (OUTPATIENT)
Dept: PEDIATRICS | Facility: CLINIC | Age: 39
End: 2024-10-09
Payer: COMMERCIAL

## 2024-10-09 DIAGNOSIS — R21 RASH: Primary | ICD-10-CM

## 2024-10-09 PROCEDURE — G2211 COMPLEX E/M VISIT ADD ON: HCPCS | Mod: 95 | Performed by: INTERNAL MEDICINE

## 2024-10-09 PROCEDURE — 99214 OFFICE O/P EST MOD 30 MIN: CPT | Mod: 95 | Performed by: INTERNAL MEDICINE

## 2024-10-09 RX ORDER — TRIAMCINOLONE ACETONIDE 1 MG/G
CREAM TOPICAL 2 TIMES DAILY
Qty: 15 G | Refills: 0 | Status: SHIPPED | OUTPATIENT
Start: 2024-10-09

## 2024-10-09 NOTE — PROGRESS NOTES
Ana is a 39 year old who is being evaluated via a billable video visit.          Assessment & Plan     (R21) Rash  (primary encounter diagnosis)  Comment: new itchy rash on the breast. She is worried about inflammatory breast cancer which is exceedingly rare.  A new erythematous itchy rash is more likely to be a reactive dermatitis to an unknown trigger. Will try a higher dose steroid cream for 1-2 weeks, and if not improving will order diagnostic mammogram.   Plan: triamcinolone (KENALOG) 0.1 % external cream              The longitudinal plan of care for the diagnosis(es)/condition(s) as documented were addressed during this visit. Due to the added complexity in care, I will continue to support Ana in the subsequent management and with ongoing continuity of care.           Subjective   Ana is a 39 year old, presenting for the following health issues:  No chief complaint on file.    HPI       She had a rash develop on Thursday - just a dot that itched. She thought it might be a bug bit.  It has spread to be more of a rash. She has treated it with hydrocortisone.     She googled it and she is worried about inflammatory breast cancer.     It's primarily itchy. It's a bit tender - not sure if the rash is tender or because she's scratching. Warm water irritates it. They use all the most sensitive skin lotions, no dyes or perfumes, etc because her kids have sensitive skin.    Maternal grandmother may have had breast cancer. There are colon cancers in the family.                 Objective           Vitals:  No vitals were obtained today due to virtual visit.    Physical Exam   GENERAL: alert and no distress  EYES: Eyes grossly normal to inspection.  No discharge or erythema, or obvious scleral/conjunctival abnormalities.  RESP: No audible wheeze, cough, or visible cyanosis.    SKIN: there is an erythematous rash in patches on the right breast from adjacent to the nipple (at about 1-2 o'clock) and  proceeding up and medially. There are about 3-4 patches  NEURO: Cranial nerves grossly intact.  Mentation and speech appropriate for age.  PSYCH: Appropriate affect, tone, and pace of words          Video-Visit Details    Type of service:  Video Visit   Originating Location (pt. Location): Home    Distant Location (provider location):  On-site  Platform used for Video Visit: Dale  Signed Electronically by: Tanesha Rodriguez MD

## 2024-10-11 ENCOUNTER — MYC MEDICAL ADVICE (OUTPATIENT)
Dept: PEDIATRICS | Facility: CLINIC | Age: 39
End: 2024-10-11
Payer: COMMERCIAL

## 2024-10-11 DIAGNOSIS — R21 RASH: Primary | ICD-10-CM

## 2024-10-11 NOTE — CONFIDENTIAL NOTE
Right sided diagnostic mammo and US ordered.     Tanesha Rodriguez MD   Internal Medicine - Pediatrics

## 2024-10-11 NOTE — TELEPHONE ENCOUNTER
- Please review pt's  message. Pended bilateral diagnostic mammogram, please review & sign, if appropriate.    If ordering mammogram, pt can contact central imaging scheduling at [phone] to schedule that mammogram.     Notes from the [facility] on 10/9/24:  Rash  (primary encounter diagnosis)  Comment: new itchy rash on the breast. She is worried about inflammatory breast cancer which is exceedingly rare.  A new erythematous itchy rash is more likely to be a reactive dermatitis to an unknown trigger. Will try a higher dose steroid cream for 1-2 weeks, and if not improving will order diagnostic mammogram.   Plan: triamcinolone (KENALOG) 0.1 % external cream    Paco Wylie RN  [facility]          
no chest pain/no palpitations

## 2024-10-15 ENCOUNTER — HOSPITAL ENCOUNTER (OUTPATIENT)
Dept: ULTRASOUND IMAGING | Facility: CLINIC | Age: 39
Discharge: HOME | End: 2024-10-15
Attending: INTERNAL MEDICINE
Payer: COMMERCIAL

## 2024-10-15 ENCOUNTER — HOSPITAL ENCOUNTER (OUTPATIENT)
Dept: MAMMOGRAPHY | Facility: CLINIC | Age: 39
Discharge: HOME | End: 2024-10-15
Attending: INTERNAL MEDICINE
Payer: COMMERCIAL

## 2024-10-15 DIAGNOSIS — R21 RASH: ICD-10-CM

## 2024-10-15 PROCEDURE — 76642 ULTRASOUND BREAST LIMITED: CPT | Mod: RT

## 2024-10-15 PROCEDURE — 77066 DX MAMMO INCL CAD BI: CPT

## 2024-10-21 ENCOUNTER — MYC MEDICAL ADVICE (OUTPATIENT)
Dept: PEDIATRICS | Facility: CLINIC | Age: 39
End: 2024-10-21
Payer: COMMERCIAL

## 2024-10-21 DIAGNOSIS — Z30.41 ORAL CONTRACEPTIVE PILL SURVEILLANCE: ICD-10-CM

## 2024-10-21 RX ORDER — DESOGESTREL AND ETHINYL ESTRADIOL 0.15-0.03
1 KIT ORAL DAILY
Qty: 84 TABLET | Refills: 4 | Status: SHIPPED | OUTPATIENT
Start: 2024-10-21

## 2024-10-21 NOTE — TELEPHONE ENCOUNTER
Dr. Hussein- patient is requesting a refill for her birth control. States she continuously takes the hormone pills to help her regular headaches.   Patient has appointment 2/3/25.   (See [employer] message).    Script pended below if appropriate.   Megan Trujillo RN

## 2024-10-25 NOTE — TELEPHONE ENCOUNTER
Sent a [employer] message to the patient   - Informed the patient that a prescription for her desogestrel-ethinyl estradiol (APRI) 0.15-30 MG-MCG tablet medication was sent to the [place] [id number] [facility] [place] - [address] [facility] on 10/21/2024     desogestrel-ethinyl estradiol (APRI) 0.15-30 MG-MCG tablet 84 tablet 4 10/21/2024 -- No   Sig - Route: Take 1 tablet by mouth daily. - Oral     Kelly MENDEZ RN   [facility]

## 2025-02-20 ENCOUNTER — PATIENT OUTREACH (OUTPATIENT)
Dept: CARE COORDINATION | Facility: CLINIC | Age: 40
End: 2025-02-20
Payer: COMMERCIAL

## 2025-03-01 ENCOUNTER — HEALTH MAINTENANCE LETTER (OUTPATIENT)
Age: 40
End: 2025-03-01

## (undated) DEVICE — KIT ENDO TURNOVER/PROCEDURE W/CLEAN A SCOPE LINERS 103888

## (undated) RX ORDER — FENTANYL CITRATE 50 UG/ML
INJECTION, SOLUTION INTRAMUSCULAR; INTRAVENOUS
Status: DISPENSED
Start: 2023-01-04

## (undated) RX ORDER — ONDANSETRON 2 MG/ML
INJECTION INTRAMUSCULAR; INTRAVENOUS
Status: DISPENSED
Start: 2023-01-04